# Patient Record
Sex: MALE | Race: OTHER | NOT HISPANIC OR LATINO | URBAN - METROPOLITAN AREA
[De-identification: names, ages, dates, MRNs, and addresses within clinical notes are randomized per-mention and may not be internally consistent; named-entity substitution may affect disease eponyms.]

---

## 2018-05-01 ENCOUNTER — INPATIENT (INPATIENT)
Facility: HOSPITAL | Age: 73
LOS: 2 days | Discharge: HOME | DRG: 246 | End: 2018-05-04
Attending: HOSPITALIST | Admitting: HOSPITALIST
Payer: COMMERCIAL

## 2018-05-01 VITALS
RESPIRATION RATE: 20 BRPM | TEMPERATURE: 98 F | HEART RATE: 72 BPM | DIASTOLIC BLOOD PRESSURE: 94 MMHG | SYSTOLIC BLOOD PRESSURE: 164 MMHG | OXYGEN SATURATION: 96 %

## 2018-05-01 DIAGNOSIS — E11.9 TYPE 2 DIABETES MELLITUS WITHOUT COMPLICATIONS: ICD-10-CM

## 2018-05-01 DIAGNOSIS — I20.0 UNSTABLE ANGINA: ICD-10-CM

## 2018-05-01 DIAGNOSIS — D72.1 EOSINOPHILIA: ICD-10-CM

## 2018-05-01 DIAGNOSIS — I10 ESSENTIAL (PRIMARY) HYPERTENSION: ICD-10-CM

## 2018-05-01 DIAGNOSIS — R07.9 CHEST PAIN, UNSPECIFIED: ICD-10-CM

## 2018-05-01 LAB
ALBUMIN SERPL ELPH-MCNC: 3.6 G/DL — SIGNIFICANT CHANGE UP (ref 3.3–5)
ALBUMIN SERPL ELPH-MCNC: 3.8 G/DL — SIGNIFICANT CHANGE UP (ref 3.3–5)
ALBUMIN SERPL ELPH-MCNC: 4 G/DL — SIGNIFICANT CHANGE UP (ref 3.3–5)
ALP SERPL-CCNC: 71 U/L — SIGNIFICANT CHANGE UP (ref 40–120)
ALP SERPL-CCNC: 72 U/L — SIGNIFICANT CHANGE UP (ref 40–120)
ALP SERPL-CCNC: 72 U/L — SIGNIFICANT CHANGE UP (ref 40–120)
ALT FLD-CCNC: 14 U/L — SIGNIFICANT CHANGE UP (ref 10–45)
ALT FLD-CCNC: 15 U/L — SIGNIFICANT CHANGE UP (ref 10–45)
ALT FLD-CCNC: 16 U/L — SIGNIFICANT CHANGE UP (ref 10–45)
ANION GAP SERPL CALC-SCNC: 12 MMOL/L — SIGNIFICANT CHANGE UP (ref 5–17)
ANION GAP SERPL CALC-SCNC: 13 MMOL/L — SIGNIFICANT CHANGE UP (ref 5–17)
ANION GAP SERPL CALC-SCNC: 13 MMOL/L — SIGNIFICANT CHANGE UP (ref 5–17)
ANISOCYTOSIS BLD QL: SLIGHT — SIGNIFICANT CHANGE UP
APTT BLD: 28.5 SEC — SIGNIFICANT CHANGE UP (ref 27.5–37.4)
AST SERPL-CCNC: 16 U/L — SIGNIFICANT CHANGE UP (ref 10–40)
AST SERPL-CCNC: 16 U/L — SIGNIFICANT CHANGE UP (ref 10–40)
AST SERPL-CCNC: 25 U/L — SIGNIFICANT CHANGE UP (ref 10–40)
BASOPHILS # BLD AUTO: 0.1 K/UL — SIGNIFICANT CHANGE UP (ref 0–0.2)
BASOPHILS # BLD AUTO: 0.1 K/UL — SIGNIFICANT CHANGE UP (ref 0–0.2)
BASOPHILS NFR BLD AUTO: 0.9 % — SIGNIFICANT CHANGE UP (ref 0–2)
BILIRUB SERPL-MCNC: 0.3 MG/DL — SIGNIFICANT CHANGE UP (ref 0.2–1.2)
BILIRUB SERPL-MCNC: 0.4 MG/DL — SIGNIFICANT CHANGE UP (ref 0.2–1.2)
BILIRUB SERPL-MCNC: 0.5 MG/DL — SIGNIFICANT CHANGE UP (ref 0.2–1.2)
BUN SERPL-MCNC: 14 MG/DL — SIGNIFICANT CHANGE UP (ref 7–23)
BUN SERPL-MCNC: 14 MG/DL — SIGNIFICANT CHANGE UP (ref 7–23)
BUN SERPL-MCNC: 17 MG/DL — SIGNIFICANT CHANGE UP (ref 7–23)
CALCIUM SERPL-MCNC: 9.4 MG/DL — SIGNIFICANT CHANGE UP (ref 8.4–10.5)
CALCIUM SERPL-MCNC: 9.6 MG/DL — SIGNIFICANT CHANGE UP (ref 8.4–10.5)
CALCIUM SERPL-MCNC: 9.7 MG/DL — SIGNIFICANT CHANGE UP (ref 8.4–10.5)
CHLORIDE SERPL-SCNC: 102 MMOL/L — SIGNIFICANT CHANGE UP (ref 96–108)
CHLORIDE SERPL-SCNC: 103 MMOL/L — SIGNIFICANT CHANGE UP (ref 96–108)
CHLORIDE SERPL-SCNC: 103 MMOL/L — SIGNIFICANT CHANGE UP (ref 96–108)
CK MB BLD-MCNC: 2.5 % — SIGNIFICANT CHANGE UP (ref 0–3.5)
CK MB CFR SERPL CALC: 1.2 NG/ML — SIGNIFICANT CHANGE UP (ref 0–6.7)
CK MB CFR SERPL CALC: 1.8 NG/ML — SIGNIFICANT CHANGE UP (ref 0–6.7)
CK MB CFR SERPL CALC: 3.6 NG/ML — SIGNIFICANT CHANGE UP (ref 0–6.7)
CK SERPL-CCNC: 72 U/L — SIGNIFICANT CHANGE UP (ref 30–200)
CK SERPL-CCNC: 81 U/L — SIGNIFICANT CHANGE UP (ref 30–200)
CK SERPL-CCNC: 93 U/L — SIGNIFICANT CHANGE UP (ref 30–200)
CO2 SERPL-SCNC: 23 MMOL/L — SIGNIFICANT CHANGE UP (ref 22–31)
CO2 SERPL-SCNC: 23 MMOL/L — SIGNIFICANT CHANGE UP (ref 22–31)
CO2 SERPL-SCNC: 24 MMOL/L — SIGNIFICANT CHANGE UP (ref 22–31)
CREAT SERPL-MCNC: 0.98 MG/DL — SIGNIFICANT CHANGE UP (ref 0.5–1.3)
CREAT SERPL-MCNC: 1.08 MG/DL — SIGNIFICANT CHANGE UP (ref 0.5–1.3)
CREAT SERPL-MCNC: 1.16 MG/DL — SIGNIFICANT CHANGE UP (ref 0.5–1.3)
DACRYOCYTES BLD QL SMEAR: SLIGHT — SIGNIFICANT CHANGE UP
ELLIPTOCYTES BLD QL SMEAR: SLIGHT — SIGNIFICANT CHANGE UP
EOSINOPHIL # BLD AUTO: 3.3 K/UL — HIGH (ref 0–0.5)
EOSINOPHIL # BLD AUTO: 3.5 K/UL — HIGH (ref 0–0.5)
EOSINOPHIL NFR BLD AUTO: 23 % — HIGH (ref 0–6)
EOSINOPHIL NFR BLD AUTO: 29.9 % — HIGH (ref 0–6)
GAS PNL BLDV: SIGNIFICANT CHANGE UP
GLUCOSE SERPL-MCNC: 164 MG/DL — HIGH (ref 70–99)
GLUCOSE SERPL-MCNC: 189 MG/DL — HIGH (ref 70–99)
GLUCOSE SERPL-MCNC: 194 MG/DL — HIGH (ref 70–99)
HBA1C BLD-MCNC: 8.1 % — HIGH (ref 4–5.6)
HCT VFR BLD CALC: 40 % — SIGNIFICANT CHANGE UP (ref 39–50)
HCT VFR BLD CALC: 40.5 % — SIGNIFICANT CHANGE UP (ref 39–50)
HCT VFR BLD CALC: 41.9 % — SIGNIFICANT CHANGE UP (ref 39–50)
HGB BLD-MCNC: 13.4 G/DL — SIGNIFICANT CHANGE UP (ref 13–17)
HGB BLD-MCNC: 13.4 G/DL — SIGNIFICANT CHANGE UP (ref 13–17)
HGB BLD-MCNC: 13.9 G/DL — SIGNIFICANT CHANGE UP (ref 13–17)
INR BLD: 1.15 RATIO — SIGNIFICANT CHANGE UP (ref 0.88–1.16)
LG PLATELETS BLD QL AUTO: SLIGHT — SIGNIFICANT CHANGE UP
LYMPHOCYTES # BLD AUTO: 28 % — SIGNIFICANT CHANGE UP (ref 13–44)
LYMPHOCYTES # BLD AUTO: 3.4 K/UL — HIGH (ref 1–3.3)
LYMPHOCYTES # BLD AUTO: 36.3 % — SIGNIFICANT CHANGE UP (ref 13–44)
LYMPHOCYTES # BLD AUTO: 4.2 K/UL — HIGH (ref 1–3.3)
MAGNESIUM SERPL-MCNC: 2.2 MG/DL — SIGNIFICANT CHANGE UP (ref 1.6–2.6)
MCHC RBC-ENTMCNC: 28.6 PG — SIGNIFICANT CHANGE UP (ref 27–34)
MCHC RBC-ENTMCNC: 28.8 PG — SIGNIFICANT CHANGE UP (ref 27–34)
MCHC RBC-ENTMCNC: 28.9 PG — SIGNIFICANT CHANGE UP (ref 27–34)
MCHC RBC-ENTMCNC: 33.2 GM/DL — SIGNIFICANT CHANGE UP (ref 32–36)
MCHC RBC-ENTMCNC: 33.2 GM/DL — SIGNIFICANT CHANGE UP (ref 32–36)
MCHC RBC-ENTMCNC: 33.4 GM/DL — SIGNIFICANT CHANGE UP (ref 32–36)
MCV RBC AUTO: 86.4 FL — SIGNIFICANT CHANGE UP (ref 80–100)
MCV RBC AUTO: 86.4 FL — SIGNIFICANT CHANGE UP (ref 80–100)
MCV RBC AUTO: 86.8 FL — SIGNIFICANT CHANGE UP (ref 80–100)
MICROCYTES BLD QL: SLIGHT — SIGNIFICANT CHANGE UP
MONOCYTES # BLD AUTO: 0.7 K/UL — SIGNIFICANT CHANGE UP (ref 0–0.9)
MONOCYTES # BLD AUTO: 0.9 K/UL — SIGNIFICANT CHANGE UP (ref 0–0.9)
MONOCYTES NFR BLD AUTO: 5.6 % — SIGNIFICANT CHANGE UP (ref 2–14)
MONOCYTES NFR BLD AUTO: 6 % — SIGNIFICANT CHANGE UP (ref 2–14)
NEUTROPHILS # BLD AUTO: 3.2 K/UL — SIGNIFICANT CHANGE UP (ref 1.8–7.4)
NEUTROPHILS # BLD AUTO: 4 K/UL — SIGNIFICANT CHANGE UP (ref 1.8–7.4)
NEUTROPHILS NFR BLD AUTO: 27.4 % — LOW (ref 43–77)
NEUTROPHILS NFR BLD AUTO: 43 % — SIGNIFICANT CHANGE UP (ref 43–77)
PHOSPHATE SERPL-MCNC: 3.2 MG/DL — SIGNIFICANT CHANGE UP (ref 2.5–4.5)
PLAT MORPH BLD: ABNORMAL
PLATELET # BLD AUTO: 256 K/UL — SIGNIFICANT CHANGE UP (ref 150–400)
PLATELET # BLD AUTO: 264 K/UL — SIGNIFICANT CHANGE UP (ref 150–400)
PLATELET # BLD AUTO: 274 K/UL — SIGNIFICANT CHANGE UP (ref 150–400)
POLYCHROMASIA BLD QL SMEAR: SLIGHT — SIGNIFICANT CHANGE UP
POTASSIUM SERPL-MCNC: 3.9 MMOL/L — SIGNIFICANT CHANGE UP (ref 3.5–5.3)
POTASSIUM SERPL-MCNC: 4.3 MMOL/L — SIGNIFICANT CHANGE UP (ref 3.5–5.3)
POTASSIUM SERPL-MCNC: 4.7 MMOL/L — SIGNIFICANT CHANGE UP (ref 3.5–5.3)
POTASSIUM SERPL-SCNC: 3.9 MMOL/L — SIGNIFICANT CHANGE UP (ref 3.5–5.3)
POTASSIUM SERPL-SCNC: 4.3 MMOL/L — SIGNIFICANT CHANGE UP (ref 3.5–5.3)
POTASSIUM SERPL-SCNC: 4.7 MMOL/L — SIGNIFICANT CHANGE UP (ref 3.5–5.3)
PROT SERPL-MCNC: 7.4 G/DL — SIGNIFICANT CHANGE UP (ref 6–8.3)
PROT SERPL-MCNC: 7.4 G/DL — SIGNIFICANT CHANGE UP (ref 6–8.3)
PROT SERPL-MCNC: 7.7 G/DL — SIGNIFICANT CHANGE UP (ref 6–8.3)
PROTHROM AB SERPL-ACNC: 12.6 SEC — SIGNIFICANT CHANGE UP (ref 9.8–12.7)
RBC # BLD: 4.62 M/UL — SIGNIFICANT CHANGE UP (ref 4.2–5.8)
RBC # BLD: 4.69 M/UL — SIGNIFICANT CHANGE UP (ref 4.2–5.8)
RBC # BLD: 4.82 M/UL — SIGNIFICANT CHANGE UP (ref 4.2–5.8)
RBC # FLD: 12.7 % — SIGNIFICANT CHANGE UP (ref 10.3–14.5)
RBC # FLD: 12.8 % — SIGNIFICANT CHANGE UP (ref 10.3–14.5)
RBC # FLD: 12.9 % — SIGNIFICANT CHANGE UP (ref 10.3–14.5)
RBC BLD AUTO: ABNORMAL
SODIUM SERPL-SCNC: 137 MMOL/L — SIGNIFICANT CHANGE UP (ref 135–145)
SODIUM SERPL-SCNC: 139 MMOL/L — SIGNIFICANT CHANGE UP (ref 135–145)
SODIUM SERPL-SCNC: 140 MMOL/L — SIGNIFICANT CHANGE UP (ref 135–145)
STOMATOCYTES BLD QL SMEAR: SLIGHT — SIGNIFICANT CHANGE UP
TARGETS BLD QL SMEAR: SLIGHT — SIGNIFICANT CHANGE UP
TROPONIN T SERPL-MCNC: 0.05 NG/ML — SIGNIFICANT CHANGE UP (ref 0–0.06)
TROPONIN T SERPL-MCNC: 0.06 NG/ML — SIGNIFICANT CHANGE UP (ref 0–0.06)
TROPONIN T SERPL-MCNC: 0.06 NG/ML — SIGNIFICANT CHANGE UP (ref 0–0.06)
TROPONIN T SERPL-MCNC: 0.09 NG/ML — HIGH (ref 0–0.06)
VIT B12 SERPL-MCNC: 563 PG/ML — SIGNIFICANT CHANGE UP (ref 232–1245)
WBC # BLD: 11.4 K/UL — HIGH (ref 3.8–10.5)
WBC # BLD: 11.7 K/UL — HIGH (ref 3.8–10.5)
WBC # BLD: 12.6 K/UL — HIGH (ref 3.8–10.5)
WBC # FLD AUTO: 11.4 K/UL — HIGH (ref 3.8–10.5)
WBC # FLD AUTO: 11.7 K/UL — HIGH (ref 3.8–10.5)
WBC # FLD AUTO: 12.6 K/UL — HIGH (ref 3.8–10.5)

## 2018-05-01 PROCEDURE — 92941 PRQ TRLML REVSC TOT OCCL AMI: CPT | Mod: RC

## 2018-05-01 PROCEDURE — 99285 EMERGENCY DEPT VISIT HI MDM: CPT | Mod: 25

## 2018-05-01 PROCEDURE — 99053 MED SERV 10PM-8AM 24 HR FAC: CPT

## 2018-05-01 PROCEDURE — 99223 1ST HOSP IP/OBS HIGH 75: CPT

## 2018-05-01 PROCEDURE — 93458 L HRT ARTERY/VENTRICLE ANGIO: CPT | Mod: 26,59

## 2018-05-01 PROCEDURE — 71046 X-RAY EXAM CHEST 2 VIEWS: CPT | Mod: 26

## 2018-05-01 PROCEDURE — 93010 ELECTROCARDIOGRAM REPORT: CPT

## 2018-05-01 PROCEDURE — 99152 MOD SED SAME PHYS/QHP 5/>YRS: CPT

## 2018-05-01 PROCEDURE — 92929: CPT | Mod: RC

## 2018-05-01 RX ORDER — ATORVASTATIN CALCIUM 80 MG/1
80 TABLET, FILM COATED ORAL DAILY
Qty: 0 | Refills: 0 | Status: DISCONTINUED | OUTPATIENT
Start: 2018-05-01 | End: 2018-05-04

## 2018-05-01 RX ORDER — INSULIN LISPRO 100/ML
VIAL (ML) SUBCUTANEOUS
Qty: 0 | Refills: 0 | Status: DISCONTINUED | OUTPATIENT
Start: 2018-05-01 | End: 2018-05-04

## 2018-05-01 RX ORDER — CLOPIDOGREL BISULFATE 75 MG/1
75 TABLET, FILM COATED ORAL DAILY
Qty: 0 | Refills: 0 | Status: DISCONTINUED | OUTPATIENT
Start: 2018-05-02 | End: 2018-05-04

## 2018-05-01 RX ORDER — GLUCAGON INJECTION, SOLUTION 0.5 MG/.1ML
1 INJECTION, SOLUTION SUBCUTANEOUS ONCE
Qty: 0 | Refills: 0 | Status: DISCONTINUED | OUTPATIENT
Start: 2018-05-01 | End: 2018-05-04

## 2018-05-01 RX ORDER — HEPARIN SODIUM 5000 [USP'U]/ML
6000 INJECTION INTRAVENOUS; SUBCUTANEOUS EVERY 6 HOURS
Qty: 0 | Refills: 0 | Status: DISCONTINUED | OUTPATIENT
Start: 2018-05-01 | End: 2018-05-01

## 2018-05-01 RX ORDER — ASPIRIN/CALCIUM CARB/MAGNESIUM 324 MG
81 TABLET ORAL DAILY
Qty: 0 | Refills: 0 | Status: DISCONTINUED | OUTPATIENT
Start: 2018-05-01 | End: 2018-05-04

## 2018-05-01 RX ORDER — SODIUM CHLORIDE 9 MG/ML
1000 INJECTION, SOLUTION INTRAVENOUS
Qty: 0 | Refills: 0 | Status: DISCONTINUED | OUTPATIENT
Start: 2018-05-01 | End: 2018-05-04

## 2018-05-01 RX ORDER — CLOPIDOGREL BISULFATE 75 MG/1
300 TABLET, FILM COATED ORAL ONCE
Qty: 0 | Refills: 0 | Status: COMPLETED | OUTPATIENT
Start: 2018-05-01 | End: 2018-05-01

## 2018-05-01 RX ORDER — INSULIN LISPRO 100/ML
VIAL (ML) SUBCUTANEOUS AT BEDTIME
Qty: 0 | Refills: 0 | Status: DISCONTINUED | OUTPATIENT
Start: 2018-05-01 | End: 2018-05-04

## 2018-05-01 RX ORDER — ASPIRIN/CALCIUM CARB/MAGNESIUM 324 MG
324 TABLET ORAL ONCE
Qty: 0 | Refills: 0 | Status: COMPLETED | OUTPATIENT
Start: 2018-05-01 | End: 2018-05-01

## 2018-05-01 RX ORDER — HEPARIN SODIUM 5000 [USP'U]/ML
INJECTION INTRAVENOUS; SUBCUTANEOUS
Qty: 25000 | Refills: 0 | Status: DISCONTINUED | OUTPATIENT
Start: 2018-05-01 | End: 2018-05-01

## 2018-05-01 RX ORDER — CAPTOPRIL 12.5 MG/1
12.5 TABLET ORAL DAILY
Qty: 0 | Refills: 0 | Status: DISCONTINUED | OUTPATIENT
Start: 2018-05-01 | End: 2018-05-01

## 2018-05-01 RX ORDER — ACETAMINOPHEN 500 MG
650 TABLET ORAL EVERY 6 HOURS
Qty: 0 | Refills: 0 | Status: DISCONTINUED | OUTPATIENT
Start: 2018-05-01 | End: 2018-05-04

## 2018-05-01 RX ORDER — HEPARIN SODIUM 5000 [USP'U]/ML
5000 INJECTION INTRAVENOUS; SUBCUTANEOUS ONCE
Qty: 0 | Refills: 0 | Status: COMPLETED | OUTPATIENT
Start: 2018-05-01 | End: 2018-05-01

## 2018-05-01 RX ORDER — DEXTROSE 50 % IN WATER 50 %
25 SYRINGE (ML) INTRAVENOUS ONCE
Qty: 0 | Refills: 0 | Status: DISCONTINUED | OUTPATIENT
Start: 2018-05-01 | End: 2018-05-04

## 2018-05-01 RX ORDER — DEXTROSE 50 % IN WATER 50 %
1 SYRINGE (ML) INTRAVENOUS ONCE
Qty: 0 | Refills: 0 | Status: DISCONTINUED | OUTPATIENT
Start: 2018-05-01 | End: 2018-05-04

## 2018-05-01 RX ORDER — HYDRALAZINE HCL 50 MG
10 TABLET ORAL ONCE
Qty: 0 | Refills: 0 | Status: DISCONTINUED | OUTPATIENT
Start: 2018-05-01 | End: 2018-05-01

## 2018-05-01 RX ORDER — CAPTOPRIL 12.5 MG/1
12.5 TABLET ORAL THREE TIMES A DAY
Qty: 0 | Refills: 0 | Status: DISCONTINUED | OUTPATIENT
Start: 2018-05-01 | End: 2018-05-02

## 2018-05-01 RX ORDER — HYDRALAZINE HCL 50 MG
10 TABLET ORAL THREE TIMES A DAY
Qty: 0 | Refills: 0 | Status: DISCONTINUED | OUTPATIENT
Start: 2018-05-01 | End: 2018-05-01

## 2018-05-01 RX ORDER — CAPTOPRIL 12.5 MG/1
6.25 TABLET ORAL DAILY
Qty: 0 | Refills: 0 | Status: DISCONTINUED | OUTPATIENT
Start: 2018-05-01 | End: 2018-05-01

## 2018-05-01 RX ADMIN — ATORVASTATIN CALCIUM 80 MILLIGRAM(S): 80 TABLET, FILM COATED ORAL at 21:42

## 2018-05-01 RX ADMIN — Medication 1: at 17:34

## 2018-05-01 RX ADMIN — CLOPIDOGREL BISULFATE 300 MILLIGRAM(S): 75 TABLET, FILM COATED ORAL at 06:40

## 2018-05-01 RX ADMIN — CAPTOPRIL 12.5 MILLIGRAM(S): 12.5 TABLET ORAL at 14:12

## 2018-05-01 RX ADMIN — Medication 324 MILLIGRAM(S): at 02:26

## 2018-05-01 RX ADMIN — CAPTOPRIL 12.5 MILLIGRAM(S): 12.5 TABLET ORAL at 21:42

## 2018-05-01 RX ADMIN — HEPARIN SODIUM 1000 UNIT(S)/HR: 5000 INJECTION INTRAVENOUS; SUBCUTANEOUS at 06:56

## 2018-05-01 RX ADMIN — HEPARIN SODIUM 5000 UNIT(S): 5000 INJECTION INTRAVENOUS; SUBCUTANEOUS at 06:56

## 2018-05-01 NOTE — H&P ADULT - NSHPPHYSICALEXAM_GEN_ALL_CORE
Vital Signs Last 24 Hrs  T(C): 36.3 (01 May 2018 05:15), Max: 36.5 (01 May 2018 01:49)  T(F): 97.4 (01 May 2018 05:15), Max: 97.7 (01 May 2018 01:49)  HR: 64 (01 May 2018 05:15) (64 - 72)  BP: 153/94 (01 May 2018 05:15) (153/94 - 164/94)  BP(mean): --  RR: 18 (01 May 2018 05:15) (18 - 20)  SpO2: 100% (01 May 2018 05:15) (96% - 100%)    GENERAL: mild  distress, well-developed, obese abdomen   HEAD:  Atraumatic, Normocephalic  ENT: EOMI, PERRLA, conjunctiva and sclera clear,  moist mucosa no pharyngeal erythema or exudates   NECK: supple , no JVD   CHEST/LUNG: Clear to auscultation bilaterally; No wheeze, equal breath sounds bilaterally   BACK: No spinal tenderness,  No CVA tenderness   HEART: Regular rate and rhythm; soft systolic murmur heard at LICS, no rubs, or gallops  ABDOMEN: Soft, Nontender, Nondistended; Bowel sounds present  EXTREMITIES:  No clubbing, cyanosis, trace bilateral  edema  MSK: No joint swelling or effusions, ROM intact   PSYCH: Normal behavior/affect  NEUROLOGY: AAOx3, non-focal, cranial nerves intact  SKIN: Normal color, No rashes or lesions

## 2018-05-01 NOTE — H&P ADULT - FAMILY HISTORY
Father  Still living? Unknown  Family history of acute myocardial infarction, Age at diagnosis: Age Unknown     Sibling  Still living? No  Family history of acute myocardial infarction, Age at diagnosis: Age Unknown

## 2018-05-01 NOTE — H&P ADULT - NSHPLABSRESULTS_GEN_ALL_CORE
Labs personally reviewed:                          13.4   11.4  )-----------( 256      ( 01 May 2018 02:31 )             40.0     05-01    140  |  103  |  17  ----------------------------<  164<H>  4.3   |  24  |  1.16    Ca    9.7      01 May 2018 02:33    TPro  7.7  /  Alb  4.0  /  TBili  0.3  /  DBili  x   /  AST  16  /  ALT  16  /  AlkPhos  72  05-01    CARDIAC MARKERS ( 01 May 2018 05:25 )  x     / 0.06 ng/mL / 81 U/L / x     / 1.2 ng/mL  CARDIAC MARKERS ( 01 May 2018 02:33 )  x     / 0.06 ng/mL / 79 U/L / x     / 1.5 ng/mL      LIVER FUNCTIONS - ( 01 May 2018 02:33 )  Alb: 4.0 g/dL / Pro: 7.7 g/dL / ALK PHOS: 72 U/L / ALT: 16 U/L / AST: 16 U/L / GGT: x           PT/INR - ( 01 May 2018 02:31 )   PT: 12.6 sec;   INR: 1.15 ratio         PTT - ( 01 May 2018 02:31 )  PTT:28.5 sec    CAPILLARY BLOOD GLUCOSE          Imaging:  CXR personally reviewed: no focal opacity,    EKG personally reviewed:

## 2018-05-01 NOTE — ED ADULT NURSE NOTE - CHIEF COMPLAINT QUOTE
pt c/o CP, sob, left arm pain and weakness to legs since just getting off flight from [facility]

## 2018-05-01 NOTE — H&P ADULT - PROBLEM SELECTOR PLAN 4
23% eosinophils on cbc , may be contributing to presentation  ? eosinophilic esophagitis or myocarditis   - strongyloides  - consider heme eval to review smear   - b12 level

## 2018-05-01 NOTE — PROGRESS NOTE ADULT - SUBJECTIVE AND OBJECTIVE BOX
Patient is a 72y old  Male who presents with a chief complaint of chest pain x one day (01 May 2018 06:29)     History of Present Illness: 	  Patient is a 72 year old [place] male with a past medical history significant for DM type II , HTN  presents with chest pain  for one day, pain is 4/10 in severity ,  localized to the left side of the chest, intermittent, lasting about 10 minutes occurring over about an hour , radiating to the left arm, feels like heaviness, associated with shortness of breath, palpitations, and  left arm numbness and tingling. Per patient’s daughter, patient had similar symptoms two weeks prior to admission while performing moderate activity. Symptoms were followed by fever and a few days of flu like symptoms, which have since resolved. Patient was now on a flight to [place] for vacation when symptoms recurred mid flight. He had a similar presentation a few years ago. Nuclear stress test at that time was normal.  Patient has a strong family history for MI including father  in 40's  and brother  in mid 30's.         REVIEW OF SYSTEMS:  CONSTITUTIONAL: No weakness, fevers or chills  EYES/ENT: No visual changes, no throat pain   RESPIRATORY: No cough, wheezing, hemoptysis; No shortness of breath  CARDIOVASCULAR: No chest pain or palpitations  GASTROINTESTINAL: No abdominal, nausea, vomiting, or hematemesis; No diarrhea or constipation. No melena or hematochezia.  GENITOURINARY: No dysuria, frequency or hematuria  NEUROLOGICAL: No dizziness, numbness, or weakness  SKIN: No itching, burning, rashes, or lesions   All other review of systems is negative unless indicated above.    VITAL SIGNS:  Vital Signs Last 24 Hrs  T(C): 36.8 (01 May 2018 11:19), Max: 36.8 (01 May 2018 11:19)  T(F): 98.3 (01 May 2018 11:19), Max: 98.3 (01 May 2018 11:19)  HR: 64 (01 May 2018 11:19) (61 - 72)  BP: 161/81 (01 May 2018 11:19) (149/83 - 164/94)  BP(mean): 108 (01 May 2018 11:19) (108 - 108)  RR: 16 (01 May 2018 11:19) (16 - 20)  SpO2: 98% (01 May 2018 11:19) (96% - 100%)    PHYSICAL EXAM:   GENERAL: no acute distress  HEENT: NC/AT, EOMI, neck supple, MMM  RESPIRATORY: LCTAB/L, no rhonchi, rales, or wheezing  CARDIOVASCULAR: RRR, no murmurs, gallops, rubs  ABDOMINAL: soft, non-tender, non-distended, positive bowel sounds   EXTREMITIES: no clubbing, cyanosis, or edema  NEUROLOGICAL: alert and oriented x 3, non-focal  SKIN: no rashes or lesions   MUSCULOSKELETAL: no gross joint deformity                          13.4   11.7  )-----------( 274      ( 01 May 2018 10:31 )             40.5     05-01    139  |  103  |  14  ----------------------------<  189<H>  3.9   |  23  |  0.98    Ca    9.6      01 May 2018 10:31    TPro  7.4  /  Alb  3.8  /  TBili  0.5  /  DBili  x   /  AST  16  /  ALT  15  /  AlkPhos  72  05-01    PT/INR - ( 01 May 2018 02:31 )   PT: 12.6 sec;   INR: 1.15 ratio         PTT - ( 01 May 2018 02:31 )  PTT:28.5 sec    CAPILLARY BLOOD GLUCOSE      POCT Blood Glucose.: 172 mg/dL (01 May 2018 10:44)  POCT Blood Glucose.: 147 mg/dL (01 May 2018 06:46)    I&O's Summary    30 Apr 2018 07:01  -  01 May 2018 07:00  --------------------------------------------------------  IN: 0 mL / OUT: 600 mL / NET: -600 mL    01 May 2018 07:01  -  01 May 2018 11:48  --------------------------------------------------------  IN: 240 mL / OUT: 0 mL / NET: 240 mL        MEDICATIONS  (STANDING):  aspirin enteric coated 81 milliGRAM(s) Oral daily  atorvastatin 80 milliGRAM(s) Oral daily  dextrose 5%. 1000 milliLiter(s) (50 mL/Hr) IV Continuous <Continuous>  dextrose 50% Injectable 25 Gram(s) IV Push once  heparin  Infusion.  Unit(s)/Hr (10 mL/Hr) IV Continuous <Continuous>  insulin lispro (HumaLOG) corrective regimen sliding scale   SubCutaneous three times a day before meals  insulin lispro (HumaLOG) corrective regimen sliding scale   SubCutaneous at bedtime

## 2018-05-01 NOTE — ED ADULT NURSE NOTE - OBJECTIVE STATEMENT
71 yo M arrived ambulatory from triage c/o midsternal chest pain started shortly after coming off Airplane 2 hrs ago and is non-radiating. Pain has been intermittent. Described as heaviness in the Midsternal chest. 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 Cardiac monitor applied O2 at 2l/min via nasal canula initiated. #18 gauge in RAC x's 1 attempt pt. tolerated well, labs drawn and sent, EKG and CXR at bedside. Dr. Jean Baptiste aware and at bedside evaluating.

## 2018-05-01 NOTE — H&P ADULT - NSHPSOCIALHISTORY_GEN_ALL_CORE
Social History:    Marital Status:  (   )    (   ) Single    (   )    (x )   Occupation: retired farmer   Lives with: ( x ) alone  (  ) children   (  ) spouse   (  ) parents  (  ) other    Substance Use (street drugs): ( x ) never used  (  ) other:  Tobacco Usage:  (  x ) never smoked   (   ) former smoker   (   ) current smoker  (     ) pack year  (        ) last cigarette date  Alcohol Usage: former  etoh use

## 2018-05-01 NOTE — CHART NOTE - NSCHARTNOTEFT_GEN_A_CORE
This patient was transferred to CCU following cardiac catheterization prior to my assessment.     IGNACIA Larson  hospitalist  pager [phone]

## 2018-05-01 NOTE — PROGRESS NOTE ADULT - ASSESSMENT
72 m w/pmh T2DM , HTN   p/w atypical chest pain , cardiac enzymes negative x 3, in the setting of unstable angina. Now s/p cath with 72 m w/pmh T2DM , HTN   p/w atypical chest pain , cardiac enzymes negative x 3, in the setting of unstable angina. Now s/p cath with 100%  occlusion distal RCA s/p VIGNESH x 1, 90% occlusion proximal RPL s/p VIGNESH x 1,  and 95% occlusion to proximal RPDA s/p DEX x 1. Occlusions were also noted in prox LAD and prox diagonal, with staged PCI of LAD/Diagonal planned for tomorrow. 72 m w/pmh T2DM , HTN   p/w atypical chest pain , cardiac enzymes negative x 3, in the setting of unstable angina. Now s/p cath with 100%  occlusion distal RCA s/p VIGNESH x 1, 90% occlusion proximal RPL s/p VIGNESH x 1,  and 95% occlusion to proximal RPDA s/p DEX x 1. Occlusions were also noted in prox LAD and prox diagonal, with staged PCI of LAD/Diagonal planned for tomorrow.     Cardiovascular 72 m w/pmh T2DM , HTN   p/w atypical chest pain , cardiac enzymes negative x 3, in the setting of unstable angina. Now s/p cath with 100%  occlusion distal RCA s/p VIGNESH x 1, 90% occlusion proximal RPL s/p VIGNESH x 1,  and 95% occlusion to proximal RPDA s/p DEX x 1. Occlusions were also noted in prox LAD and prox diagonal, with staged PCI of LAD/Diagonal planned for tomorrow.     CV:   Problem: Unstable Angina  -VIGNESH RCA x 1, RPL x1, RPDA x 1, as above  -Staged PCI of LAD/Diag tomorrow  -Continue heparin gtt   -Continue with ASA and Plavix  -Initiating home dose of captopril, 12.5 mg daily  - BB once HR tolerates    Problem: Hypertension  -re-initiating captopril, as above  -Hydralazine IV if hypertension persists  -Will not start BB now given mild bradycardia    Pulm:  -No active issues  -Monitor for signs of overload    GI:   -No active issues  -Carb controlled/DASH diet    :   -No active issues  -continue monitoring creatinine     Endocrine:   Problem: T2DM  -Holding home metformin  -Low dose ISS    Heme:   Problem: Eosinophilia  -f/u strongyloides ab  -Consider stool O&P    ID:   -No active issues    DVT ppx:   -On heparin gtt 72 m w/pmh T2DM , HTN   p/w atypical chest pain , cardiac enzymes negative x 3, in the setting of unstable angina. Now s/p cath with 100%  occlusion distal RCA s/p VIGNESH x 1, 90% occlusion proximal RPL s/p VIGNESH x 1,  and 95% occlusion to proximal RPDA s/p DEX x 1. Occlusions were also noted in prox LAD and prox diagonal, with staged PCI of LAD/Diagonal planned for tomorrow.     CV:   Problem: Unstable Angina  -VIGNESH RCA x 1, RPL x1, RPDA x 1, as above  -Staged PCI of LAD/Diag tomorrow  -Continue with ASA and Plavix  -Initiating home dose of captopril, 12.5 mg TID  - BB once HR tolerates    Problem: Hypertension  -re-initiating captopril, as above  -Hydralazine IV if hypertension persists  -Will not start BB now given slower HR     Pulm:  -No active issues  -Monitor for signs of overload    GI:   -No active issues  -Carb controlled/DASH diet    :   -No active issues  -continue monitoring creatinine     Endocrine:   Problem: T2DM  -Holding home metformin  -Low dose ISS    Heme:   Problem: Eosinophilia  -f/u strongyloides ab    ID:   -No active issues    DVT ppx:   -SCDs

## 2018-05-01 NOTE — H&P ADULT - HISTORY OF PRESENT ILLNESS
Patient is a 72 year old [place] male with a past medical history significant for DM type II , HTN  presents with chest pain  for one day, pain is 4/10 in severity ,  localized to the left side of the chest, intermittent, lasting about 10 minutes occurring over about an hour , radiating to the left arm, feels like heaviness, associated with shortness of breath, palpitations, and  left arm numbness and tingling. Per patient’s daughter, patient had similar symptoms two weeks prior to admission while performing moderate activity. Symptoms were followed by fever and a few days of flu like symptoms, which have since resolved. Patient was now on a flight to [place] for vacation when symptoms recurred mid flight. He had a similar presentation a few years ago. Nuclear stress test at that time was normal.  Patient has a strong family history for MI including father  in 40's  and brother  in mid 30's.

## 2018-05-01 NOTE — ED ADULT TRIAGE NOTE - CHIEF COMPLAINT QUOTE
pt c/o CP, sob, left arm pain and weakness to legs since just getting off flight from [facility]

## 2018-05-01 NOTE — ED PROVIDER NOTE - OBJECTIVE STATEMENT
73 yo M w/ pmhx of DM, HTN c/o chest pain since last night 11:30pm. Pain is localized to the left side of the chest, intermittent, lasting 10mns, radiating to the left arm, feels like heaviness, associated with SOB, left arm numbness and tingling. Father had MI at the age of 40. Patient had similar pain about 2 weeks ago while exerting himself but never sought medical attention. denies any productive cough, fever, chills, nausea, vomiting, leg swelling, hx of blood clots or anything else.

## 2018-05-01 NOTE — CHART NOTE - NSCHARTNOTEFT_GEN_A_CORE
Patient underwent a PCI procedure and is being admitted as they are at increased risk for major adverse cardiac and vascular events if discharged due to the following high risk characteristics:      Pre-procedure Clinical Criteria  Major : Acute Coronary Syndrome - NSTEMI    Angiographic Criteria   Major: Need for staged procedure before discharge        MARCO Magaña [id number]

## 2018-05-01 NOTE — CHART NOTE - NSCHARTNOTEFT_GEN_A_CORE
Removal of Radial Band    Pulses in the right upper extremity are palpable. The patient was placed in the supine position. The insertion site was identified and the band deflated per protocol. The radial band was removed slowly. Direct pressure was applied for  2 minutes and a pressure dressing was applied.      Monitoring of the right wrist and both upper extremities including neuro-vascular checks and vital signs every 15 minutes x 4.    Complications: None    Comments: No bleeding, ecchymosis or hematoma. Pressure dressing placed. Patient instructed to move extremity gently, no soaking, scrubbing or application of creams/lotions to area.

## 2018-05-01 NOTE — ED PROVIDER NOTE - INTERPRETATION
EKG reviewed for rate, rhythm, axis, intervals and segments, including QRS morphology, P wave appearance T wave appearance, VT interval, and QT interval.  I find the EKG to be unremarkable in all of these regards except as follows: RBBB, inferorlateral TWI

## 2018-05-01 NOTE — H&P ADULT - PROBLEM SELECTOR PLAN 1
Discussed case with cardiology , history concerning for unstable angina , given strong family history and description of symptoms will plan for cath   - Plavix load   - ASA   - heparin infusion   - Cardiology consult appreciated - further recommendations to be follower up by day team   - Cardiac cath per cardiology  - NPO for cath  - serial cardiac enzymes   - monitor on telemetry

## 2018-05-01 NOTE — ED ADULT NURSE NOTE - CHPI ED SYMPTOMS NEG
no vomiting/no nausea/no syncope/no dizziness/no chills/no back pain/no cough/no fever/no diaphoresis

## 2018-05-01 NOTE — H&P ADULT - NSHPREVIEWOFSYSTEMS_GEN_ALL_CORE
CONSTITUTIONAL: No weakness, fevers or chills  EYES/ENT: No visual changes;  No dysphagia  NECK: No pain or stiffness  RESPIRATORY: No cough, wheezing, hemoptysis; + shortness of breath  CARDIOVASCULAR: + chest pain + palpitations; No lower extremity edema  EXTREMITIES: no le edema, cyanosis, clubbing  GASTROINTESTINAL: No abdominal or epigastric pain. No nausea, vomiting, or hematemesis; No diarrhea or constipation. No melena or hematochezia.  BACK: No back pain  GENITOURINARY: No dysuria, frequency or hematuria  NEUROLOGICAL: No numbness or weakness  MSK: no joint swelling or pain  SKIN: No itching, burning, rashes, or lesions   PSYCH: no agitation  All other review of systems is negative unless indicated above.

## 2018-05-01 NOTE — CONSULT NOTE ADULT - SUBJECTIVE AND OBJECTIVE BOX
Cardiology Attending Consult Note      CHIEF COMPLAINT/REASON FOR CONSULT: Chest Pain   Date of Admission: 18    HISTORY OF PRESENT ILLNESS:    72y.o. Male with DM, HTN, +FH (Father with MI@44, Brother with MI before 50), admitted 2018 with substernal chest pain, 6-7/10, pressure like, radiating to left arm, intermittent in intensity, present since 11:30 PM last night. He reports that he has been feeling unwell over the last several weeks, 2 weeks ago had an episode of fever/chills which he through was the flu. 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 He also notes an episode of chest pain similar to this 3 months ago. Yesterday he was flying from [facility] [place] for vacation when he began having chest pain as noted above. Of note, he had a similar episode in  that began while flying, subsequently presented to the ED and underwent pharmacologic MPI which was negative at that time. At the time of my visit this morning he continues to have substernal chest pain, 4-5/10, and appears uncomfortable. He denies any N/V/D/F/C. No HA. No dizziness.     In the ED: EKG NSR, RBBB, TWI II, III, AF, V4-V6, overall unchanged when compared to prior EKG 2018, Troponin 0.06 -> 0.06    ALLERGIES: NKDA    Home Medications:  aspirin: 81 milligram(s) orally once a day (01 May 2018 06:31)  captopril: 12.5 milligram(s) orally once a day (01 May 2018 06:31)  metFORMIN: 500 milligram(s) orally 2 times a day (01 May 2018 06:31)  pravastatin 40 mg oral tablet: 1 tab(s) orally once a day (at bedtime) (01 May 2018 06:31)    MEDICATIONS:  clopidogrel Tablet 300 milliGRAM(s) Oral once    PAST MEDICAL & SURGICAL HISTORY:  Diabetes  Hypertension  No significant past surgical history      FAMILY HISTORY:  +FH: Father with MI@44, Brother with MI at <50      SOCIAL HISTORY:    Never smoked, social ETOH 1x/month, no IVDU  -Former  in [place], retired    REVIEW OF SYSTEMS:    CONSTITUTIONAL: No weakness, fevers or chills  EYES/ENT: No visual changes;  No vertigo or throat pain   NECK: No pain or stiffness  RESPIRATORY: No cough, wheezing, hemoptysis; +shortness of breath  CARDIOVASCULAR: +chest pain or palpitations  GASTROINTESTINAL: No abdominal or epigastric pain. No nausea, vomiting, or hematemesis; No diarrhea or constipation. No melena or hematochezia.  GENITOURINARY: No dysuria, frequency or hematuria  NEUROLOGICAL: No numbness or weakness  SKIN: No itching, burning, rashes, or lesions   All other review of systems is negative unless indicated above.    PHYSICAL EXAM:  T(C): 36.3 (18 @ 05:15), Max: 36.5 (18 @ 01:49)  HR: 64 (18 @ 05:15) (64 - 72)  BP: 153/94 (18 @ 05:15) (153/94 - 164/94)  RR: 18 (18 @ 05:15) (18 - 20)  SpO2: 100% (18 @ 05:15) (96% - 100%)  Wt(kg): --    Drug Dosing Weight      I&O's Summary      Appearance: Normal	  HEENT:   Normal oral mucosa, PERRL, EOMI	  Lymphatic: No lymphadenopathy  Cardiovascular: Normal S1 S2, No JVD, No murmurs  Respiratory: Lungs clear to auscultation	  Psychiatry: A & O x 3, Mood & affect appropriate  Gastrointestinal:  Soft, Non-tender, + BS	  Skin: No rashes, No ecchymoses, No cyanosis	  Neurologic: Non-focal  Extremities: Normal range of motion, No clubbing, cyanosis or edema  Vascular: Peripheral pulses palpable 2+ bilaterally    LABS:	 	    CBC Full  -  ( 01 May 2018 02:31 )  WBC Count : 11.4 K/uL  Hemoglobin : 13.4 g/dL  Hematocrit : 40.0 %  Platelet Count - Automated : 256 K/uL  Mean Cell Volume : 86.4 fl  Mean Cell Hemoglobin : 28.9 pg  Mean Cell Hemoglobin Concentration : 33.4 gm/dL  Auto Neutrophil # : 4.0 K/uL  Auto Lymphocyte # : 3.4 K/uL  Auto Monocyte # : 0.9 K/uL  Auto Eosinophil # : 3.3 K/uL  Auto Basophil # : 0.1 K/uL  Auto Neutrophil % : 43.0 %  Auto Lymphocyte % : 28.0 %  Auto Monocyte % : 6.0 %  Auto Eosinophil % : 23.0 %  Auto Basophil % : x        140  |  103  |  17  ----------------------------<  164<H>  4.3   |  24  |  1.16    Ca    9.7      01 May 2018 02:33    TPro  7.7  /  Alb  4.0  /  TBili  0.3  /  DBili  x   /  AST  16  /  ALT  16  /  AlkPhos  72      PT/INR - ( 01 May 2018 02:31 )   PT: 12.6 sec;   INR: 1.15 ratio         PTT - ( 01 May 2018 02:31 )  PTT:28.5 sec  LIVER FUNCTIONS - ( 01 May 2018 02:33 )  Alb: 4.0 g/dL / Pro: 7.7 g/dL / ALK PHOS: 72 U/L / ALT: 16 U/L / AST: 16 U/L / GGT: x           Trop: 0.06 -> 0.06  CKMB: 1.5 -> 1.2    EK2018: NSR, RBBB, TWI II, III, AF, V4-V6, overall unchanged when compared to prior EKG 2018    Telemetry: NSR, SB 50-60's    CXR: Clear Lungs    TTE: 2015:  EF (Teicholtz): 56 %  Doppler Peak Velocity (m/sec): AoV=1.2  ------------------------------------------------------------------------  Observations:  Mitral Valve: Mitral annular calcification, otherwise  normal mitral valve. Minimal mitral regurgitation.  Aortic Valve/Aorta: Normal trileaflet aortic valve. Peak  left ventricular outflow tract gradient equals 2 mm Hg.  Aortic Root: 3.6 cm.  Left Atrium: Normal left atrium.  LA volume index = 26  cc/m2.  Left Ventricle: Normal left ventricular systolic function.  No segmental wall motion abnormalities. Normal left  ventricular internal dimensions and wall thicknesses. Mild  diastolic dysfunction (Stage I).  Right Heart: Normal right atrium. The right ventricle is  not well visualized; grossly normal right ventricular  systolic function. Normal tricuspid valve. Normal pulmonic  valve. Mild pulmonic regurgitation.  Pericardium/Pleura: Normal pericardium with no pericardial  effusion.  ------------------------------------------------------------------------  Conclusions:  1. Normal left ventricular systolic function. No segmental  wall motion abnormalities.  2. Mild diastolic dysfunction (Stage I).  3. The right ventricle is not well visualized; grossly  normal right ventricular systolic function.  *** No previous Echo exam.    Pharmacologic MPI 2015:  NUCLEAR FINDINGS:  The left ventricle was normal in size. Normal myocardial  perfusion scan, with no evidence of infarction or  inducible ischemia.  ------------------------------------------------------------------------  GATED ANALYSIS:  Post-stress gated wall motion analysis was performed (LVEF  = 55 %;LVEDV = 91 ml.)  ------------------------------------------------------------------------  IMPRESSIONS:Normal Study  * Chest Pain: No chest pain with administration of  Regadenoson.  * Symptom: No Symptom.  * HR Response: Appropriate.  * BP Response: Appropriate.  * Heart Rhythm: Sinus Bradycardia - 50 BPM.  * ECG Abnormalities: None.  * Arrhythmia: None.  * The left ventricle was normal in size. Normal myocardial  perfusion scan, with no evidence of infarction or  inducible ischemia.  * Post-stress gated wall motion analysis was performed  (LVEF = 55 %;LVEDV = 91 ml.)  * No previous Nuclear/Stress exam.        A/P: 72y.o. Male with DM, HTN, +FH (Father with MI@44, Brother with MI before 50), admitted 2018 with substernal chest pain, 6-7/10, pressure like, radiating to left arm, intermittent in intensity, present since 11:30 PM last night.    1. Chest Pain - [place] male with multiple risk factors including DM, HTN, +Family History (Brother + Father with early MI), now presenting with progressive chest pain at rest, substernal, radiating to left arm, concerning for unstable angina. Differential includes PE (Recent flight), pericarditis/pleuritis in the setting of recent flu like syndrome, hypertensive heart syndrome (/94). However given multiple risk factors, will treat empirically for UA/ACS at this time.  -Please start Heparin GTT  -Serial EKG if progression in chest pain or pain changes in quality  -Cont ASA 81 mg po QD  -Load with Plavix 300 mg po QD, then Plavix 75 mg po QD  -Start Atorvastatin 80 mg po first dose now, then po QHS  -Hold Beta blockade as HR 50-60's.  -Monitor on Telemetry  -Please order routine TTE  -Please order Hemoglobin A1c, Lipid Panel, TSH, BNP  -Please keep NPO for possible LHC in AM. Will discuss with interventional team    2. HTN -  -Cont Captopril 12.5 mg po QD    3. DM - Hold Metformin  -RUSLAN    4. HL -   Atorvastatin 80 mg po QHS as above  	  Thank you for this interesting consult. My team will continue to follow along with you.    Ac Guadarrama MD  Cardiology Attending  [facility]   Cell: [phone]  (Cardiology Nocturnist cell number available 7 pm - 7 am every night; available daytime week days for follow-up only; daytime weekends covered by general cardiology consult service)

## 2018-05-01 NOTE — ED PROVIDER NOTE - ATTENDING CONTRIBUTION TO CARE
73 yo male with CP radiating to L arm; had similar episode in [facility] 2 weeks ago but did not seek medical care.  Also reportedly had a similar episode 3 years ago, seen here with stress test and discharge.  Still with some mild residual discomfort on exam.  Will give ASA x 4, check labs, admit for r/o ACS.  Not STEMI, not PE.

## 2018-05-02 LAB
ALBUMIN SERPL ELPH-MCNC: 3.6 G/DL — SIGNIFICANT CHANGE UP (ref 3.3–5)
ALP SERPL-CCNC: 69 U/L — SIGNIFICANT CHANGE UP (ref 40–120)
ALT FLD-CCNC: 14 U/L — SIGNIFICANT CHANGE UP (ref 10–45)
ANION GAP SERPL CALC-SCNC: 11 MMOL/L — SIGNIFICANT CHANGE UP (ref 5–17)
ANION GAP SERPL CALC-SCNC: 12 MMOL/L — SIGNIFICANT CHANGE UP (ref 5–17)
AST SERPL-CCNC: 24 U/L — SIGNIFICANT CHANGE UP (ref 10–40)
BILIRUB SERPL-MCNC: 0.4 MG/DL — SIGNIFICANT CHANGE UP (ref 0.2–1.2)
BUN SERPL-MCNC: 13 MG/DL — SIGNIFICANT CHANGE UP (ref 7–23)
BUN SERPL-MCNC: 14 MG/DL — SIGNIFICANT CHANGE UP (ref 7–23)
CALCIUM SERPL-MCNC: 9.3 MG/DL — SIGNIFICANT CHANGE UP (ref 8.4–10.5)
CALCIUM SERPL-MCNC: 9.4 MG/DL — SIGNIFICANT CHANGE UP (ref 8.4–10.5)
CHLORIDE SERPL-SCNC: 100 MMOL/L — SIGNIFICANT CHANGE UP (ref 96–108)
CHLORIDE SERPL-SCNC: 101 MMOL/L — SIGNIFICANT CHANGE UP (ref 96–108)
CHOLEST SERPL-MCNC: 160 MG/DL — SIGNIFICANT CHANGE UP (ref 10–199)
CK MB BLD-MCNC: 10.2 % — HIGH (ref 0–3.5)
CK MB CFR SERPL CALC: 13.2 NG/ML — HIGH (ref 0–6.7)
CK SERPL-CCNC: 129 U/L — SIGNIFICANT CHANGE UP (ref 30–200)
CO2 SERPL-SCNC: 24 MMOL/L — SIGNIFICANT CHANGE UP (ref 22–31)
CO2 SERPL-SCNC: 24 MMOL/L — SIGNIFICANT CHANGE UP (ref 22–31)
CREAT SERPL-MCNC: 1.05 MG/DL — SIGNIFICANT CHANGE UP (ref 0.5–1.3)
CREAT SERPL-MCNC: 1.11 MG/DL — SIGNIFICANT CHANGE UP (ref 0.5–1.3)
GLUCOSE BLDC GLUCOMTR-MCNC: 120 MG/DL — HIGH (ref 70–99)
GLUCOSE BLDC GLUCOMTR-MCNC: 138 MG/DL — HIGH (ref 70–99)
GLUCOSE BLDC GLUCOMTR-MCNC: 163 MG/DL — HIGH (ref 70–99)
GLUCOSE SERPL-MCNC: 111 MG/DL — HIGH (ref 70–99)
GLUCOSE SERPL-MCNC: 164 MG/DL — HIGH (ref 70–99)
HCT VFR BLD CALC: 42.2 % — SIGNIFICANT CHANGE UP (ref 39–50)
HDLC SERPL-MCNC: 34 MG/DL — LOW (ref 40–125)
HGB BLD-MCNC: 13.7 G/DL — SIGNIFICANT CHANGE UP (ref 13–17)
LIPID PNL WITH DIRECT LDL SERPL: 106 MG/DL — SIGNIFICANT CHANGE UP
MAGNESIUM SERPL-MCNC: 2.1 MG/DL — SIGNIFICANT CHANGE UP (ref 1.6–2.6)
MCHC RBC-ENTMCNC: 28 PG — SIGNIFICANT CHANGE UP (ref 27–34)
MCHC RBC-ENTMCNC: 32.6 GM/DL — SIGNIFICANT CHANGE UP (ref 32–36)
MCV RBC AUTO: 86 FL — SIGNIFICANT CHANGE UP (ref 80–100)
PHOSPHATE SERPL-MCNC: 3.3 MG/DL — SIGNIFICANT CHANGE UP (ref 2.5–4.5)
PLATELET # BLD AUTO: 254 K/UL — SIGNIFICANT CHANGE UP (ref 150–400)
POTASSIUM SERPL-MCNC: 4.2 MMOL/L — SIGNIFICANT CHANGE UP (ref 3.5–5.3)
POTASSIUM SERPL-MCNC: 4.2 MMOL/L — SIGNIFICANT CHANGE UP (ref 3.5–5.3)
POTASSIUM SERPL-SCNC: 4.2 MMOL/L — SIGNIFICANT CHANGE UP (ref 3.5–5.3)
POTASSIUM SERPL-SCNC: 4.2 MMOL/L — SIGNIFICANT CHANGE UP (ref 3.5–5.3)
PROT SERPL-MCNC: 7.2 G/DL — SIGNIFICANT CHANGE UP (ref 6–8.3)
RBC # BLD: 4.9 M/UL — SIGNIFICANT CHANGE UP (ref 4.2–5.8)
RBC # FLD: 13 % — SIGNIFICANT CHANGE UP (ref 10.3–14.5)
SODIUM SERPL-SCNC: 136 MMOL/L — SIGNIFICANT CHANGE UP (ref 135–145)
SODIUM SERPL-SCNC: 136 MMOL/L — SIGNIFICANT CHANGE UP (ref 135–145)
TOTAL CHOLESTEROL/HDL RATIO MEASUREMENT: 4.7 RATIO — SIGNIFICANT CHANGE UP (ref 3.4–9.6)
TRIGL SERPL-MCNC: 99 MG/DL — SIGNIFICANT CHANGE UP (ref 10–149)
TROPONIN T SERPL-MCNC: 0.38 NG/ML — HIGH (ref 0–0.06)
TSH SERPL-MCNC: 1.3 UIU/ML — SIGNIFICANT CHANGE UP (ref 0.27–4.2)
WBC # BLD: 10.6 K/UL — HIGH (ref 3.8–10.5)
WBC # FLD AUTO: 10.6 K/UL — HIGH (ref 3.8–10.5)

## 2018-05-02 PROCEDURE — 99233 SBSQ HOSP IP/OBS HIGH 50: CPT | Mod: GC

## 2018-05-02 PROCEDURE — 93306 TTE W/DOPPLER COMPLETE: CPT | Mod: 26

## 2018-05-02 PROCEDURE — 93010 ELECTROCARDIOGRAM REPORT: CPT

## 2018-05-02 PROCEDURE — 92929: CPT | Mod: LD

## 2018-05-02 PROCEDURE — 99152 MOD SED SAME PHYS/QHP 5/>YRS: CPT

## 2018-05-02 PROCEDURE — 92928 PRQ TCAT PLMT NTRAC ST 1 LES: CPT | Mod: LD

## 2018-05-02 RX ORDER — LISINOPRIL 2.5 MG/1
2.5 TABLET ORAL DAILY
Qty: 0 | Refills: 0 | Status: DISCONTINUED | OUTPATIENT
Start: 2018-05-02 | End: 2018-05-04

## 2018-05-02 RX ORDER — INSULIN GLARGINE 100 [IU]/ML
10 INJECTION, SOLUTION SUBCUTANEOUS AT BEDTIME
Qty: 0 | Refills: 0 | Status: DISCONTINUED | OUTPATIENT
Start: 2018-05-02 | End: 2018-05-04

## 2018-05-02 RX ORDER — INSULIN GLARGINE 100 [IU]/ML
10 INJECTION, SOLUTION SUBCUTANEOUS AT BEDTIME
Qty: 0 | Refills: 0 | Status: DISCONTINUED | OUTPATIENT
Start: 2018-05-02 | End: 2018-05-02

## 2018-05-02 RX ORDER — INSULIN GLARGINE 100 [IU]/ML
12 INJECTION, SOLUTION SUBCUTANEOUS AT BEDTIME
Qty: 0 | Refills: 0 | Status: DISCONTINUED | OUTPATIENT
Start: 2018-05-02 | End: 2018-05-02

## 2018-05-02 RX ORDER — LISINOPRIL 2.5 MG/1
2.5 TABLET ORAL DAILY
Qty: 0 | Refills: 0 | Status: DISCONTINUED | OUTPATIENT
Start: 2018-05-02 | End: 2018-05-02

## 2018-05-02 RX ORDER — INSULIN LISPRO 100/ML
3 VIAL (ML) SUBCUTANEOUS
Qty: 0 | Refills: 0 | Status: DISCONTINUED | OUTPATIENT
Start: 2018-05-02 | End: 2018-05-04

## 2018-05-02 RX ADMIN — LISINOPRIL 2.5 MILLIGRAM(S): 2.5 TABLET ORAL at 14:03

## 2018-05-02 RX ADMIN — CLOPIDOGREL BISULFATE 75 MILLIGRAM(S): 75 TABLET, FILM COATED ORAL at 11:48

## 2018-05-02 RX ADMIN — INSULIN GLARGINE 10 UNIT(S): 100 INJECTION, SOLUTION SUBCUTANEOUS at 22:04

## 2018-05-02 RX ADMIN — ATORVASTATIN CALCIUM 80 MILLIGRAM(S): 80 TABLET, FILM COATED ORAL at 21:02

## 2018-05-02 RX ADMIN — Medication 3 UNIT(S): at 16:12

## 2018-05-02 RX ADMIN — Medication 650 MILLIGRAM(S): at 15:38

## 2018-05-02 RX ADMIN — Medication 1: at 08:43

## 2018-05-02 RX ADMIN — Medication 650 MILLIGRAM(S): at 14:51

## 2018-05-02 RX ADMIN — Medication 650 MILLIGRAM(S): at 05:47

## 2018-05-02 RX ADMIN — Medication 650 MILLIGRAM(S): at 06:30

## 2018-05-02 RX ADMIN — Medication 81 MILLIGRAM(S): at 11:48

## 2018-05-02 RX ADMIN — CAPTOPRIL 12.5 MILLIGRAM(S): 12.5 TABLET ORAL at 05:44

## 2018-05-02 RX ADMIN — Medication 3 UNIT(S): at 08:43

## 2018-05-02 NOTE — PROGRESS NOTE ADULT - ASSESSMENT
72 m w/pmh T2DM , HTN   p/w atypical chest pain , cardiac enzymes negative x 3, in the setting of unstable angina. Now s/p cath with 100%  occlusion distal RCA s/p VIGNESH x 1, 90% occlusion proximal RPL s/p VIGNESH x 1,  and 95% occlusion to proximal RPDA s/p DEX x 1. Occlusions were also noted in prox LAD and prox diagonal, with staged PCI of LAD/Diagonal planned for tomorrow.     CV:   Problem: Unstable Angina  -VIGNESH RCA x 1, RPL x1, RPDA x 1, as above  -Staged PCI of LAD/Diag today  -Continue with ASA and Plavix  -d/manny captopril in light of softer BPs; initiated lisinopril 2.5 mg daily, uptitrate to 5 mg if tolerated  - BB once HR tolerates    Problem: Hypertension  -initiated lisinopril, as above  -Hydralazine IV if hypertension persists  -Will not start BB now given slower HR     Pulm:  -No active issues  -Monitor for signs of overload    GI:   -No active issues  -Carb controlled/DASH diet    :   -No active issues  -continue monitoring creatinine     Endocrine:   Problem: T2DM  -HgbA1C of 8, not far from target of 7.5 for an elderly patient  -Holding home metformin  -Low dose ISS  -Given hyperglycemia, started lantus 10 u qhs and humalog 3 u tid-ac    Heme:   Problem: Eosinophilia  -f/u strongyloides ab  -stool O&P    ID:   -No active issues    DVT ppx:   -SCDs

## 2018-05-02 NOTE — CHART NOTE - NSCHARTNOTEFT_GEN_A_CORE
====================  CCU MIDNIGHT ROUNDS  ====================    KEVIN SPARROW  [id number]    ====================  SUMMARY: HPI:  Patient is a 72 year old [place]ian male with a past medical history significant for DM type II , HTN  presents with chest pain  for one day, pain is 4/10 in severity ,  localized to the left side of the chest, intermittent, lasting about 10 minutes occurring over about an hour , radiating to the left arm, feels like heaviness, associated with shortness of breath, palpitations, and  left arm numbness and tingling. Per patient’s daughter, patient had similar symptoms two weeks prior to admission while performing moderate activity. Symptoms were followed by fever and a few days of flu like symptoms, which have since resolved. Patient was now on a flight to [place] for vacation when symptoms recurred mid flight. He had a similar presentation a few years ago. Nuclear stress test at that time was normal.  Patient has a strong family history for MI including father  in 40's  and brother  in mid 30's. (01 May 2018 06:29)    ====================        ====================  NEW EVENTS: s/p PCI to RCA, R radial band removed. Pt without symptoms.   ====================        ====================  VITALS (Last 12 hrs):  ====================    T(C): 36.7 (05-01-18 @ 23:00), Max: 36.9 (05-01-18 @ 16:00)  HR: 56 (05-02-18 @ 00:00) (52 - 64)  BP: 100/64 (05-02-18 @ 00:00) (97/56 - 173/88)  BP(mean): 75 (05-02-18 @ 00:00) (69 - 128)  RR: 19 (05-02-18 @ 00:00) (12 - 19)  SpO2: 96% (05-02-18 @ 00:00) (96% - 99%)      TELEMETRY: sinus cassidy 50s.        I&O's Summary    30 Apr 2018 07:01  -  01 May 2018 07:00  --------------------------------------------------------  IN: 0 mL / OUT: 600 mL / NET: -600 mL    01 May 2018 07:01  -  02 May 2018 00:35  --------------------------------------------------------  IN: 520 mL / OUT: 1200 mL / NET: -680 mL        ====================  PLAN:  -unstable angina- CE negative.  s/p PCI to RCA with LAD disease planning staged procedure. R radial band removed. On DAPT, statin, ACE. TTE in AM. Change captopril to lisinopril prior to d/c.  ====================    HEALTH ISSUES - PROBLEM Dx:  Eosinophilia: Eosinophilia  Hypertension: Hypertension  Diabetes: Diabetes  Unstable angina: Unstable angina      Karissa Brink, CCU PA #[id number]

## 2018-05-02 NOTE — CHART NOTE - NSCHARTNOTEFT_GEN_A_CORE
Removal of Radial Band    Pulses in the right upper extremity are palpable. The patient was placed in the supine position. The insertion site was identified and the band deflated per protocol. The radial band was removed slowly. Direct pressure was applied for 5 minutes.    Monitoring of the right wrists and both upper extremities including neuro-vascular checks and vital signs every 15 minutes x 4.    Complications: None    Comments: No bleeding, ecchymosis or hematoma. Pressure dressing applied. Patient instructed to keep arm relatively still. No soaking, scrubbing, or applying lotion/creams to the area.

## 2018-05-02 NOTE — PROGRESS NOTE ADULT - SUBJECTIVE AND OBJECTIVE BOX
Patient is a 72y old  Male who presents with a chief complaint of chest pain x one day (01 May 2018 06:29)      Events 	    MEDICATIONS  (STANDING):  aspirin enteric coated 81 milliGRAM(s) Oral daily  atorvastatin 80 milliGRAM(s) Oral daily  clopidogrel Tablet 75 milliGRAM(s) Oral daily  dextrose 5%. 1000 milliLiter(s) (50 mL/Hr) IV Continuous <Continuous>  dextrose 50% Injectable 25 Gram(s) IV Push once  insulin glargine Injectable (LANTUS) 10 Unit(s) SubCutaneous at bedtime  insulin lispro (HumaLOG) corrective regimen sliding scale   SubCutaneous three times a day before meals  insulin lispro (HumaLOG) corrective regimen sliding scale   SubCutaneous at bedtime  insulin lispro Injectable (HumaLOG) 3 Unit(s) SubCutaneous three times a day before meals  lisinopril 2.5 milliGRAM(s) Oral daily    MEDICATIONS  (PRN):  acetaminophen   Tablet. 650 milliGRAM(s) Oral every 6 hours PRN Mild Pain (1 - 3)  dextrose Gel 1 Dose(s) Oral once PRN Blood Glucose LESS THAN 70 milliGRAM(s)/deciliter  glucagon  Injectable 1 milliGRAM(s) IntraMuscular once PRN Glucose LESS THAN 70 milligrams/deciliter      REVIEW OF SYSTEMS:  Otherwise, 10 point ROS done and otherwise negative.      Vital Signs Last 24 Hrs  T(C): 36.5 (02 May 2018 08:00), Max: 36.9 (01 May 2018 16:00)  T(F): 97.7 (02 May 2018 08:00), Max: 98.5 (01 May 2018 16:00)  HR: 70 (02 May 2018 09:00) (52 - 70)  BP: 128/77 (02 May 2018 09:00) (97/56 - 173/88)  BP(mean): 92 (02 May 2018 09:00) (69 - 132)  RR: 18 (02 May 2018 09:00) (12 - 21)  SpO2: 98% (02 May 2018 09:00) (96% - 100%)  I&O's Summary    01 May 2018 07:01  -  02 May 2018 07:00  --------------------------------------------------------  IN: 660 mL / OUT: 1200 mL / NET: -540 mL    02 May 2018 07:01  -  02 May 2018 10:32  --------------------------------------------------------  IN: 240 mL / OUT: 0 mL / NET: 240 mL      CAPILLARY BLOOD GLUCOSE      POCT Blood Glucose.: 163 mg/dL (02 May 2018 08:32)  POCT Blood Glucose.: 183 mg/dL (01 May 2018 21:39)  POCT Blood Glucose.: 168 mg/dL (01 May 2018 17:33)  POCT Blood Glucose.: 109 mg/dL (01 May 2018 13:39)  POCT Blood Glucose.: 172 mg/dL (01 May 2018 10:44)        PHYSICAL EXAM:  Appearance: Normal	  HEENT:   Normal oral mucosa, PERRL, EOMI	  Lymphatic: No lymphadenopathy  Cardiovascular: Normal S1 S2, No JVD, No murmurs, No edema  Respiratory: Lungs clear to auscultation	  Psychiatry: A & O x 3, Mood & affect appropriate  Gastrointestinal:  Soft, Non-tender, + BS	  Skin: No rashes, No ecchymoses, No cyanosis	  Neurologic: Non-focal  Extremities: Normal range of motion, No clubbing, cyanosis or edema  Vascular: Peripheral pulses palpable 2+ bilaterally    LABS:                        13.7   10.6  )-----------( 254      ( 02 May 2018 00:27 )             42.2                         13.9   12.6  )-----------( 264      ( 01 May 2018 16:31 )             41.9     05-02    136  |  101  |  14  ----------------------------<  164<H>  4.2   |  24  |  1.05  05-01    137  |  102  |  14  ----------------------------<  194<H>  4.7   |  23  |  1.08    Ca    9.3      02 May 2018 00:27  Ca    9.4      01 May 2018 16:31  Phos  3.3     05-02  Mg     2.1     05-02    TPro  7.2  /  Alb  3.6  /  TBili  0.4  /  DBili  x   /  AST  24  /  ALT  14  /  AlkPhos  69  05-02  TPro  7.4  /  Alb  3.6  /  TBili  0.4  /  DBili  x   /  AST  25  /  ALT  14  /  AlkPhos  71  05-01  	 	    PT/INR - ( 01 May 2018 02:31 )   PT: 12.6 sec;   INR: 1.15 ratio         PTT - ( 01 May 2018 02:31 )  PTT:28.5 sec    proBNP:   05-02 Chol 160  HDL 34<L> Trig 99  HgA1c: Hemoglobin A1C, Whole Blood: 8.1 % (05-01 @ 19:44)    TSH: Thyroid Stimulating Hormone, Serum: 1.30 uIU/mL (05-01 @ 17:17)       02 May 2018 00:27 Troponin 0.38 ng/mL /  U/L / CKMB x     / CKMB Units 13.2 ng/mL   01 May 2018 16:31 Troponin 0.09 ng/mL / CK 93 U/L / CKMB x     / CKMB Units 3.6 ng/mL   01 May 2018 10:31 Troponin 0.05 ng/mL / CK 72 U/L / CKMB x     / CKMB Units 1.8 ng/mL        TELEMETRY: 	    ECG:  	  RADIOLOGY:  OTHER: 	    PREVIOUS DIAGNOSTIC TESTING:    [ ] Echocardiogram:  [ ]  Catheterization:  [ ] Stress Test: Patient is a 72y old  Male who presents with a chief complaint of chest pain x one day (01 May 2018 06:29)      Events 	  No acute events ON    MEDICATIONS  (STANDING):  aspirin enteric coated 81 milliGRAM(s) Oral daily  atorvastatin 80 milliGRAM(s) Oral daily  clopidogrel Tablet 75 milliGRAM(s) Oral daily  dextrose 5%. 1000 milliLiter(s) (50 mL/Hr) IV Continuous <Continuous>  dextrose 50% Injectable 25 Gram(s) IV Push once  insulin glargine Injectable (LANTUS) 10 Unit(s) SubCutaneous at bedtime  insulin lispro (HumaLOG) corrective regimen sliding scale   SubCutaneous three times a day before meals  insulin lispro (HumaLOG) corrective regimen sliding scale   SubCutaneous at bedtime  insulin lispro Injectable (HumaLOG) 3 Unit(s) SubCutaneous three times a day before meals  lisinopril 2.5 milliGRAM(s) Oral daily    MEDICATIONS  (PRN):  acetaminophen   Tablet. 650 milliGRAM(s) Oral every 6 hours PRN Mild Pain (1 - 3)  dextrose Gel 1 Dose(s) Oral once PRN Blood Glucose LESS THAN 70 milliGRAM(s)/deciliter  glucagon  Injectable 1 milliGRAM(s) IntraMuscular once PRN Glucose LESS THAN 70 milligrams/deciliter      REVIEW OF SYSTEMS:  Otherwise, 10 point ROS done and otherwise negative.      Vital Signs Last 24 Hrs  T(C): 36.5 (02 May 2018 08:00), Max: 36.9 (01 May 2018 16:00)  T(F): 97.7 (02 May 2018 08:00), Max: 98.5 (01 May 2018 16:00)  HR: 70 (02 May 2018 09:00) (52 - 70)  BP: 128/77 (02 May 2018 09:00) (97/56 - 173/88)  BP(mean): 92 (02 May 2018 09:00) (69 - 132)  RR: 18 (02 May 2018 09:00) (12 - 21)  SpO2: 98% (02 May 2018 09:00) (96% - 100%)  I&O's Summary    01 May 2018 07:01  -  02 May 2018 07:00  --------------------------------------------------------  IN: 660 mL / OUT: 1200 mL / NET: -540 mL    02 May 2018 07:01  -  02 May 2018 10:32  --------------------------------------------------------  IN: 240 mL / OUT: 0 mL / NET: 240 mL      CAPILLARY BLOOD GLUCOSE      POCT Blood Glucose.: 163 mg/dL (02 May 2018 08:32)  POCT Blood Glucose.: 183 mg/dL (01 May 2018 21:39)  POCT Blood Glucose.: 168 mg/dL (01 May 2018 17:33)  POCT Blood Glucose.: 109 mg/dL (01 May 2018 13:39)  POCT Blood Glucose.: 172 mg/dL (01 May 2018 10:44)        PHYSICAL EXAM:  Appearance: Normal	  HEENT:   Normal oral mucosa, PERRL, EOMI	  Lymphatic: No lymphadenopathy  Cardiovascular: Normal S1 S2, No JVD, No murmurs, No edema  Respiratory: Lungs clear to auscultation	  Psychiatry: A & O x 3, Mood & affect appropriate  Gastrointestinal:  Soft, Non-tender, + BS	  Skin: No rashes, No ecchymoses, No cyanosis	  Neurologic: Non-focal  Extremities: Normal range of motion, No clubbing, cyanosis or edema  Vascular: Peripheral pulses palpable 2+ bilaterally    LABS:                        13.7   10.6  )-----------( 254      ( 02 May 2018 00:27 )             42.2                         13.9   12.6  )-----------( 264      ( 01 May 2018 16:31 )             41.9     05-02    136  |  101  |  14  ----------------------------<  164<H>  4.2   |  24  |  1.05  05-01    137  |  102  |  14  ----------------------------<  194<H>  4.7   |  23  |  1.08    Ca    9.3      02 May 2018 00:27  Ca    9.4      01 May 2018 16:31  Phos  3.3     05-02  Mg     2.1     05-02    TPro  7.2  /  Alb  3.6  /  TBili  0.4  /  DBili  x   /  AST  24  /  ALT  14  /  AlkPhos  69  05-02  TPro  7.4  /  Alb  3.6  /  TBili  0.4  /  DBili  x   /  AST  25  /  ALT  14  /  AlkPhos  71  05-01  	 	    PT/INR - ( 01 May 2018 02:31 )   PT: 12.6 sec;   INR: 1.15 ratio         PTT - ( 01 May 2018 02:31 )  PTT:28.5 sec    proBNP:   05-02 Chol 160  HDL 34<L> Trig 99  HgA1c: Hemoglobin A1C, Whole Blood: 8.1 % (05-01 @ 19:44)    TSH: Thyroid Stimulating Hormone, Serum: 1.30 uIU/mL (05-01 @ 17:17)       02 May 2018 00:27 Troponin 0.38 ng/mL /  U/L / CKMB x     / CKMB Units 13.2 ng/mL   01 May 2018 16:31 Troponin 0.09 ng/mL / CK 93 U/L / CKMB x     / CKMB Units 3.6 ng/mL   01 May 2018 10:31 Troponin 0.05 ng/mL / CK 72 U/L / CKMB x     / CKMB Units 1.8 ng/mL        TELEMETRY: 	    ECG:  	  RADIOLOGY:  OTHER: 	    PREVIOUS DIAGNOSTIC TESTING:    [ ] Echocardiogram:  [ ]  Catheterization:  [ ] Stress Test:

## 2018-05-02 NOTE — CHART NOTE - NSCHARTNOTEFT_GEN_A_CORE
CCU course:    72 m w/pmh T2DM , HTN  p/w atypical chest pain , cardiac enzymes negative x 3, in the setting of unstable angina. Now s/p cath with 100% occlusion distal RCA s/p VIGNESH x 1, 90% occlusion proximal RPL s/p VIGNESH x 1,  and 95% occlusion to proximal RPDA s/p DEX x 1. Occlusions were also noted in prox LAD and prox diagonal, and patient underwent staged PCI with 1 stent to pLAD today. Patient started on ASA, plavix, atorvastatin and lisinopril.       MEDICATIONS:  acetaminophen   Tablet. 650 milliGRAM(s) Oral every 6 hours PRN  aspirin enteric coated 81 milliGRAM(s) Oral daily  atorvastatin 80 milliGRAM(s) Oral daily  clopidogrel Tablet 75 milliGRAM(s) Oral daily  dextrose 5%. 1000 milliLiter(s) IV Continuous <Continuous>  dextrose 50% Injectable 25 Gram(s) IV Push once  dextrose Gel 1 Dose(s) Oral once PRN  glucagon  Injectable 1 milliGRAM(s) IntraMuscular once PRN  insulin glargine Injectable (LANTUS) 10 Unit(s) SubCutaneous at bedtime  insulin lispro (HumaLOG) corrective regimen sliding scale   SubCutaneous three times a day before meals  insulin lispro (HumaLOG) corrective regimen sliding scale   SubCutaneous at bedtime  insulin lispro Injectable (HumaLOG) 3 Unit(s) SubCutaneous three times a day before meals  lisinopril 2.5 milliGRAM(s) Oral daily      ALLERGIES:  No Known Allergies    OBJECTIVE:  VITAL SIGNS:  ICU Vital Signs Last 24 Hrs  T(C): 37.4 (02 May 2018 19:00), Max: 37.4 (02 May 2018 19:00)  T(F): 99.3 (02 May 2018 19:00), Max: 99.3 (02 May 2018 19:00)  HR: 68 (02 May 2018 19:30) (54 - 70)  BP: 116/74 (02 May 2018 19:30) (97/56 - 149/93)  BP(mean): 93 (02 May 2018 19:30) (69 - 112)  ABP: --  ABP(mean): --  RR: 20 (02 May 2018 19:30) (12 - 21)  SpO2: 96% (02 May 2018 19:30) (95% - 99%)        05-01 @ 07:01  -  05-02 @ 07:00  --------------------------------------------------------  IN: 660 mL / OUT: 1200 mL / NET: -540 mL    05-02 @ 07:01  -  05-02 @ 19:55  --------------------------------------------------------  IN: 480 mL / OUT: 500 mL / NET: -20 mL      CAPILLARY BLOOD GLUCOSE      POCT Blood Glucose.: 120 mg/dL (02 May 2018 16:03)    PHYSICAL EXAM:  Appearance: Normal	  HEENT:   Normal oral mucosa, PERRL, EOMI	  Lymphatic: No lymphadenopathy  Cardiovascular: Normal S1 S2, No JVD, No murmurs, No edema  Respiratory: Lungs clear to auscultation	  Psychiatry: A & O x 3, Mood & affect appropriate  Gastrointestinal:  Soft, Non-tender, + BS	  Skin: No rashes, No ecchymoses, No cyanosis	  Neurologic: Non-focal  Extremities: Normal range of motion, No clubbing, cyanosis or edema  Vascular: Peripheral pulses palpable 2+ bilaterally    LABS:                        13.7   10.6  )-----------( 254      ( 02 May 2018 00:27 )             42.2     05-02    136  |  100  |  13  ----------------------------<  111<H>  4.2   |  24  |  1.11    Ca    9.4      02 May 2018 16:38  Phos  3.3     05-02  Mg     2.1     05-02    TPro  7.2  /  Alb  3.6  /  TBili  0.4  /  DBili  x   /  AST  24  /  ALT  14  /  AlkPhos  69  05-02    LIVER FUNCTIONS - ( 02 May 2018 00:27 )  Alb: 3.6 g/dL / Pro: 7.2 g/dL / ALK PHOS: 69 U/L / ALT: 14 U/L / AST: 24 U/L / GGT: x           PT/INR - ( 01 May 2018 02:31 )   PT: 12.6 sec;   INR: 1.15 ratio         PTT - ( 01 May 2018 02:31 )  PTT:28.5 sec    CARDIAC MARKERS ( 02 May 2018 00:27 )  x     / 0.38 ng/mL / 129 U/L / x     / 13.2 ng/mL  CARDIAC MARKERS ( 01 May 2018 16:31 )  x     / 0.09 ng/mL / 93 U/L / x     / 3.6 ng/mL  CARDIAC MARKERS ( 01 May 2018 10:31 )  x     / 0.05 ng/mL / 72 U/L / x     / 1.8 ng/mL  CARDIAC MARKERS ( 01 May 2018 05:25 )  x     / 0.06 ng/mL / 81 U/L / x     / 1.2 ng/mL  CARDIAC MARKERS ( 01 May 2018 02:33 )  x     / 0.06 ng/mL / 79 U/L / x     / 1.5 ng/mL    72 m w/pmh T2DM , HTN   p/w atypical chest pain , cardiac enzymes negative x 3, in the setting of unstable angina. Now s/p cath with 100%  occlusion distal RCA s/p VIGNESH x 1, 90% occlusion proximal RPL s/p VIGNESH x 1,  and 95% occlusion to proximal RPDA s/p DEX x 1. Occlusions were also noted in prox LAD and prox diagonal, with staged PCI of LAD/Diagonal planned for tomorrow.     CV:   Problem: Unstable Angina  -VIGNESH RCA x 1, RPL x1, RPDA x 1, as above  -Staged PCI with VIGNESH X1 to pLAD today   -Continue with ASA and Plavix  - d/manny captopril in light of softer BPs; initiated lisinopril 2.5 mg daily, uptitrate to 5 mg if tolerated  - BB once HR tolerates    Problem: Hypertension  -initiated lisinopril, as above, uptitrate to 5 mg if tolerated  - BB once HR tolerates      GI:   -No active issues  -Carb controlled/DASH diet    Endocrine:   Problem: T2DM  -HgbA1C of 8, not far from target of 7.5 for an elderly patient  -Holding home metformin  -Low dose ISS  -Given hyperglycemia, started lantus 10 u qhs and humalog 3 u tid-ac    Heme:   Problem: Eosinophilia  -f/u strongyloides ab  -stool O&P    DVT ppx:   -SCDs      Kellie Curiel M.D.   PGY-1 | Internal Medicine   [phone] | [id number] CCU course:    72 m w/pmh T2DM , HTN  p/w atypical chest pain. Cardiac enzymes negative x 3, in the setting of unstable angina. Patient underwent urgent cath with 100% occlusion distal RCA s/p VIGNESH x 1, 90% occlusion proximal RPL s/p VIGNESH x 1,  and 95% occlusion to proximal RPDA s/p DEX x 1. Occlusions were also noted in prox LAD and prox diagonal, and patient underwent staged PCI with 1 stent to pLAD today. Patient started on ASA, plavix, atorvastatin and lisinopril.       MEDICATIONS:  acetaminophen   Tablet. 650 milliGRAM(s) Oral every 6 hours PRN  aspirin enteric coated 81 milliGRAM(s) Oral daily  atorvastatin 80 milliGRAM(s) Oral daily  clopidogrel Tablet 75 milliGRAM(s) Oral daily  dextrose 5%. 1000 milliLiter(s) IV Continuous <Continuous>  dextrose 50% Injectable 25 Gram(s) IV Push once  dextrose Gel 1 Dose(s) Oral once PRN  glucagon  Injectable 1 milliGRAM(s) IntraMuscular once PRN  insulin glargine Injectable (LANTUS) 10 Unit(s) SubCutaneous at bedtime  insulin lispro (HumaLOG) corrective regimen sliding scale   SubCutaneous three times a day before meals  insulin lispro (HumaLOG) corrective regimen sliding scale   SubCutaneous at bedtime  insulin lispro Injectable (HumaLOG) 3 Unit(s) SubCutaneous three times a day before meals  lisinopril 2.5 milliGRAM(s) Oral daily      ALLERGIES:  No Known Allergies    OBJECTIVE:  VITAL SIGNS:  ICU Vital Signs Last 24 Hrs  T(C): 37.4 (02 May 2018 19:00), Max: 37.4 (02 May 2018 19:00)  T(F): 99.3 (02 May 2018 19:00), Max: 99.3 (02 May 2018 19:00)  HR: 68 (02 May 2018 19:30) (54 - 70)  BP: 116/74 (02 May 2018 19:30) (97/56 - 149/93)  BP(mean): 93 (02 May 2018 19:30) (69 - 112)  ABP: --  ABP(mean): --  RR: 20 (02 May 2018 19:30) (12 - 21)  SpO2: 96% (02 May 2018 19:30) (95% - 99%)        05-01 @ 07:01  -  05-02 @ 07:00  --------------------------------------------------------  IN: 660 mL / OUT: 1200 mL / NET: -540 mL    05-02 @ 07:01  -  05-02 @ 19:55  --------------------------------------------------------  IN: 480 mL / OUT: 500 mL / NET: -20 mL      CAPILLARY BLOOD GLUCOSE      POCT Blood Glucose.: 120 mg/dL (02 May 2018 16:03)    PHYSICAL EXAM:  Appearance: Normal	  HEENT:   Normal oral mucosa, PERRL, EOMI	  Lymphatic: No lymphadenopathy  Cardiovascular: Normal S1 S2, No JVD, No murmurs, No edema  Respiratory: Lungs clear to auscultation	  Psychiatry: A & O x 3, Mood & affect appropriate  Gastrointestinal:  Soft, Non-tender, + BS	  Skin: No rashes, No ecchymoses, No cyanosis	  Neurologic: Non-focal  Extremities: Normal range of motion, No clubbing, cyanosis or edema  Vascular: Peripheral pulses palpable 2+ bilaterally    LABS:                        13.7   10.6  )-----------( 254      ( 02 May 2018 00:27 )             42.2     05-02    136  |  100  |  13  ----------------------------<  111<H>  4.2   |  24  |  1.11    Ca    9.4      02 May 2018 16:38  Phos  3.3     05-02  Mg     2.1     05-02    TPro  7.2  /  Alb  3.6  /  TBili  0.4  /  DBili  x   /  AST  24  /  ALT  14  /  AlkPhos  69  05-02    LIVER FUNCTIONS - ( 02 May 2018 00:27 )  Alb: 3.6 g/dL / Pro: 7.2 g/dL / ALK PHOS: 69 U/L / ALT: 14 U/L / AST: 24 U/L / GGT: x           PT/INR - ( 01 May 2018 02:31 )   PT: 12.6 sec;   INR: 1.15 ratio         PTT - ( 01 May 2018 02:31 )  PTT:28.5 sec    CARDIAC MARKERS ( 02 May 2018 00:27 )  x     / 0.38 ng/mL / 129 U/L / x     / 13.2 ng/mL  CARDIAC MARKERS ( 01 May 2018 16:31 )  x     / 0.09 ng/mL / 93 U/L / x     / 3.6 ng/mL  CARDIAC MARKERS ( 01 May 2018 10:31 )  x     / 0.05 ng/mL / 72 U/L / x     / 1.8 ng/mL  CARDIAC MARKERS ( 01 May 2018 05:25 )  x     / 0.06 ng/mL / 81 U/L / x     / 1.2 ng/mL  CARDIAC MARKERS ( 01 May 2018 02:33 )  x     / 0.06 ng/mL / 79 U/L / x     / 1.5 ng/mL      Plan:    CV:   Problem: Unstable Angina  - VIGNESH RCA x 1, RPL x1, RPDA x 1; Staged PCI with VIGNESH X1 to pLAD today   - Continue with ASA, Plavix, statin  - d/manny captopril in light of softer BPs; initiated lisinopril 2.5 mg daily, uptitrate to 5 mg if tolerated  - BB once HR tolerates    Problem: Hypertension  -initiated lisinopril, as above, uptitrate to 5 mg if tolerated  - BB once HR tolerates    GI:   -No active issues  -Carb controlled/DASH diet    Endocrine:   Problem: T2DM  -HgbA1C of 8, not far from target of 7.5 for an elderly patient  -Holding home metformin  -Low dose ISS  -Given hyperglycemia, started lantus 10 u qhs and humalog 3 u tid-ac    Heme:   Problem: Eosinophilia  -f/u strongyloides ab  -stool O&P    DVT ppx:   -SCDs      Kellie Curiel M.D.   PGY-1 | Internal Medicine   [phone] | [id number]

## 2018-05-03 ENCOUNTER — TRANSCRIPTION ENCOUNTER (OUTPATIENT)
Age: 73
End: 2018-05-03

## 2018-05-03 DIAGNOSIS — I25.10 ATHEROSCLEROTIC HEART DISEASE OF NATIVE CORONARY ARTERY WITHOUT ANGINA PECTORIS: ICD-10-CM

## 2018-05-03 DIAGNOSIS — I10 ESSENTIAL (PRIMARY) HYPERTENSION: ICD-10-CM

## 2018-05-03 DIAGNOSIS — E11.9 TYPE 2 DIABETES MELLITUS WITHOUT COMPLICATIONS: ICD-10-CM

## 2018-05-03 LAB
ANION GAP SERPL CALC-SCNC: 11 MMOL/L — SIGNIFICANT CHANGE UP (ref 5–17)
BASOPHILS # BLD AUTO: 0 K/UL — SIGNIFICANT CHANGE UP (ref 0–0.2)
BASOPHILS NFR BLD AUTO: 0 % — SIGNIFICANT CHANGE UP (ref 0–2)
BUN SERPL-MCNC: 13 MG/DL — SIGNIFICANT CHANGE UP (ref 7–23)
CALCIUM SERPL-MCNC: 9.3 MG/DL — SIGNIFICANT CHANGE UP (ref 8.4–10.5)
CHLORIDE SERPL-SCNC: 100 MMOL/L — SIGNIFICANT CHANGE UP (ref 96–108)
CO2 SERPL-SCNC: 24 MMOL/L — SIGNIFICANT CHANGE UP (ref 22–31)
CREAT SERPL-MCNC: 1.16 MG/DL — SIGNIFICANT CHANGE UP (ref 0.5–1.3)
CULTURE RESULTS: SIGNIFICANT CHANGE UP
EOSINOPHIL # BLD AUTO: 4.44 K/UL — HIGH (ref 0–0.5)
EOSINOPHIL NFR BLD AUTO: 42.5 % — HIGH (ref 0–6)
GLUCOSE BLDC GLUCOMTR-MCNC: 119 MG/DL — HIGH (ref 70–99)
GLUCOSE BLDC GLUCOMTR-MCNC: 155 MG/DL — HIGH (ref 70–99)
GLUCOSE SERPL-MCNC: 158 MG/DL — HIGH (ref 70–99)
HCT VFR BLD CALC: 41.6 % — SIGNIFICANT CHANGE UP (ref 39–50)
HGB BLD-MCNC: 13.5 G/DL — SIGNIFICANT CHANGE UP (ref 13–17)
LYMPHOCYTES # BLD AUTO: 1.57 K/UL — SIGNIFICANT CHANGE UP (ref 1–3.3)
LYMPHOCYTES # BLD AUTO: 15 % — SIGNIFICANT CHANGE UP (ref 13–44)
MAGNESIUM SERPL-MCNC: 2.2 MG/DL — SIGNIFICANT CHANGE UP (ref 1.6–2.6)
MCHC RBC-ENTMCNC: 27.7 PG — SIGNIFICANT CHANGE UP (ref 27–34)
MCHC RBC-ENTMCNC: 32.5 GM/DL — SIGNIFICANT CHANGE UP (ref 32–36)
MCV RBC AUTO: 85.2 FL — SIGNIFICANT CHANGE UP (ref 80–100)
MONOCYTES # BLD AUTO: 1.04 K/UL — HIGH (ref 0–0.9)
MONOCYTES NFR BLD AUTO: 10 % — SIGNIFICANT CHANGE UP (ref 2–14)
NEUTROPHILS # BLD AUTO: 3.39 K/UL — SIGNIFICANT CHANGE UP (ref 1.8–7.4)
NEUTROPHILS NFR BLD AUTO: 32.5 % — LOW (ref 43–77)
PHOSPHATE SERPL-MCNC: 3.7 MG/DL — SIGNIFICANT CHANGE UP (ref 2.5–4.5)
PLATELET # BLD AUTO: 250 K/UL — SIGNIFICANT CHANGE UP (ref 150–400)
POTASSIUM SERPL-MCNC: 4.6 MMOL/L — SIGNIFICANT CHANGE UP (ref 3.5–5.3)
POTASSIUM SERPL-SCNC: 4.6 MMOL/L — SIGNIFICANT CHANGE UP (ref 3.5–5.3)
RBC # BLD: 4.88 M/UL — SIGNIFICANT CHANGE UP (ref 4.2–5.8)
RBC # FLD: 15.2 % — HIGH (ref 10.3–14.5)
SODIUM SERPL-SCNC: 135 MMOL/L — SIGNIFICANT CHANGE UP (ref 135–145)
SPECIMEN SOURCE: SIGNIFICANT CHANGE UP
WBC # BLD: 10.44 K/UL — SIGNIFICANT CHANGE UP (ref 3.8–10.5)
WBC # FLD AUTO: 10.44 K/UL — SIGNIFICANT CHANGE UP (ref 3.8–10.5)

## 2018-05-03 PROCEDURE — 99233 SBSQ HOSP IP/OBS HIGH 50: CPT

## 2018-05-03 RX ORDER — METOPROLOL TARTRATE 50 MG
12.5 TABLET ORAL
Qty: 0 | Refills: 0 | Status: DISCONTINUED | OUTPATIENT
Start: 2018-05-03 | End: 2018-05-04

## 2018-05-03 RX ADMIN — Medication 3 UNIT(S): at 12:21

## 2018-05-03 RX ADMIN — Medication 1: at 08:32

## 2018-05-03 RX ADMIN — CLOPIDOGREL BISULFATE 75 MILLIGRAM(S): 75 TABLET, FILM COATED ORAL at 08:31

## 2018-05-03 RX ADMIN — Medication 1: at 17:18

## 2018-05-03 RX ADMIN — Medication 3 UNIT(S): at 17:18

## 2018-05-03 RX ADMIN — Medication 12.5 MILLIGRAM(S): at 17:27

## 2018-05-03 RX ADMIN — Medication 3 UNIT(S): at 08:32

## 2018-05-03 RX ADMIN — Medication 81 MILLIGRAM(S): at 08:31

## 2018-05-03 RX ADMIN — ATORVASTATIN CALCIUM 80 MILLIGRAM(S): 80 TABLET, FILM COATED ORAL at 21:46

## 2018-05-03 RX ADMIN — INSULIN GLARGINE 10 UNIT(S): 100 INJECTION, SOLUTION SUBCUTANEOUS at 21:47

## 2018-05-03 RX ADMIN — LISINOPRIL 2.5 MILLIGRAM(S): 2.5 TABLET ORAL at 05:09

## 2018-05-03 NOTE — DISCHARGE NOTE ADULT - CARE PLAN
Principal Discharge DX:	Coronary artery disease, occlusive  Goal:	risk modification  Assessment and plan of treatment:	Coronary artery disease is a condition where the arteries the supply the heart muscle get clogges with fatty deposits & puts you at risk for a heart attack  Call your doctor if you have any new pain, pressure, or discomfort in the center of your chest, pain, tingling or discomfort in arms, back, neck, jaw, or stomach, shortness of breath, nausea, vomiting, burping or heartburn, sweating, cold and clammy skin, racing or abnormal heartbeat for more than 10 minutes or if they keep coming & going.  Call 911 and do not tr to get to hospital by care  You can help yourself with lefestyle changes (quitting smoking if you smoke), eat lots of fruits & vegetables & low fat dairy products, not a lot of meat & fatty foods, walk or some form of physical activity most days of the week, lose weight if you are overweight  Take your cardiac medication as prescribed to lower cholesterol, to lower blood pressure, aspirin to prevent blood clots, and diabetes control  Make sure to keep appointments with doctor for cardiac follow up care  Secondary Diagnosis:	Stented coronary artery  Goal:	patent stent  Assessment and plan of treatment:	Angioplasty or coronary stenting are procedures to open up narrowed or blocked coronary arteries in the heart.  A stent is a tiny metal tube that helps to prop open an artery in the heart muscle.    Your doctor will instruct you when you can drive or resume usual physical activities  You MUST take aspirin & another agent Plavix, Brilinta) to help prevent clots inside the stent.  It is VERY important to take these medications as directed unless your cardiologist says it is OK to stop.  If another physican advises you to stop them, call cardiologist to discuss this advise since there is a risk of a heart attack or even death stopping these medications earlier than they should be.  Do NOT take more than 81 mg aspirin with Brilinta  The most common problems after coronary stenting is bleeding, bruising, & soreness at the tube insertion site - you can use tylenol for discomfort if not contraindicated  Call your doctor if you have chest pain, fever, pain, swelling, or redness where the tube went in  Secondary Diagnosis:	Hypertension  Goal:	Follow up with your medical doctor to establish long term blood pressure treatment goals.  Assessment and plan of treatment:	Low salt diet  Activity as tolerated.  Take all medication as prescribed.  Follow up with your medical doctor for routine blood pressure monitoring at your next visit.  Notify your doctor if you have any of the following symptoms:   Dizziness, Lightheadedness, Blurry vision, Headache, Chest pain, Shortness of breath  Secondary Diagnosis:	Diabetes  Goal:	disease management  Assessment and plan of treatment:	HgA1C this admission 8.1  Make sure you get your HgA1c checked every three months.  If you take oral diabetes medications, check your blood glucose two times a day.  If you take insulin, check your blood glucose before meals and at bedtime.  It's important not to skip any meals.  Keep a log of your blood glucose results and always take it with you to your doctor appointments.  Keep a list of your current medications including injectables and over the counter medications and bring this medication list with you to all your doctor appointments.  If you have not seen your ophthalmologist this year call for appointment.  Check your feet daily for redness, sores, or openings. Do not self treat. If no improvement in two days call your primary care physician for an appointment.  Low blood sugar (hypoglycemia) is a blood sugar below 70mg/dl. Check your blood sugar if you feel signs/symptoms of hypoglycemia. If your blood sugar is below 70 take 15 grams of carbohydrates (ex 4 oz of apple juice, 3-4 glucose tablets, or 4-6 oz of regular soda) wait 15 minutes and repeat blood sugar to make sure it comes up above 70.  If your blood sugar is above 70 and you are due for a meal, have a meal.  If you are not due for a meal have a snack.  This snack helps keeps your blood sugar at a safe range.

## 2018-05-03 NOTE — PROGRESS NOTE ADULT - SUBJECTIVE AND OBJECTIVE BOX
SUBJ: no chest pain, no SOB , Transferred out of CCU last nigh, had staged PCI to LAD yesterday    MEDICATIONS  (STANDING):  aspirin enteric coated 81 milliGRAM(s) Oral daily  atorvastatin 80 milliGRAM(s) Oral daily  clopidogrel Tablet 75 milliGRAM(s) Oral daily  dextrose 5%. 1000 milliLiter(s) (50 mL/Hr) IV Continuous <Continuous>  dextrose 50% Injectable 25 Gram(s) IV Push once  insulin glargine Injectable (LANTUS) 10 Unit(s) SubCutaneous at bedtime  insulin lispro (HumaLOG) corrective regimen sliding scale   SubCutaneous three times a day before meals  insulin lispro (HumaLOG) corrective regimen sliding scale   SubCutaneous at bedtime  insulin lispro Injectable (HumaLOG) 3 Unit(s) SubCutaneous three times a day before meals  lisinopril 2.5 milliGRAM(s) Oral daily    MEDICATIONS  (PRN):  acetaminophen   Tablet. 650 milliGRAM(s) Oral every 6 hours PRN Mild Pain (1 - 3)  dextrose Gel 1 Dose(s) Oral once PRN Blood Glucose LESS THAN 70 milliGRAM(s)/deciliter  glucagon  Injectable 1 milliGRAM(s) IntraMuscular once PRN Glucose LESS THAN 70 milligrams/deciliter      Vital Signs Last 24 Hrs  T(C): 37.3 (03 May 2018 04:26), Max: 37.4 (02 May 2018 19:00)  T(F): 99.2 (03 May 2018 04:26), Max: 99.3 (02 May 2018 19:00)  HR: 75 (03 May 2018 04:26) (60 - 75)  BP: 105/66 (03 May 2018 04:26) (103/66 - 149/93)  BP(mean): 85 (02 May 2018 23:00) (78 - 112)  RR: 18 (03 May 2018 04:26) (16 - 22)  SpO2: 96% (03 May 2018 04:26) (95% - 99%)    REVIEW OF SYSTEMS:  CONSTITUTIONAL: No fever, or fatigue  EYES: No visual disturbances, or discharge  RESPIRATORY: No cough, wheezing, chills or hemoptysis; No shortness of breath  CARDIOVASCULAR: No chest pain, palpitations, dizziness, or leg swelling  GASTROINTESTINAL: No abdominal or epigastric pain. No nausea, vomiting, or hematemesis;   GENITOURINARY: No dysuria, frequency, hematuria, or incontinence  NEUROLOGICAL: No headaches, memory loss, loss of strength, numbness, or tremors  SKIN: No itching, burning, rashes, or lesions   ENDOCRINE: No heat or cold intolerance; No hair loss  MUSCULOSKELETAL: No joint pain or swelling; No muscle, back, or extremity pain      PHYSICAL EXAM:  · CONSTITUTIONAL: M lying in bed NAD      · EYES:  no drainage or redness  · NECK: No bruits; no JVD  ·RESPIRATORY:   airway patent; breath sounds equal; good air movement; respirations non-labored; clear to auscultation bilaterally; no chest wall tenderness; no intercostal retractions; no rales,rhonchi or wheeze  · CARDIOVASCULAR: regular rate and rhythm  no rub  no murmur  normal PMI  . GASTROINTESTINAL:  no distention; no masses palpable; bowel sounds normal  · EXTREMITIES: No cyanosis, clubbing or edema  · VASCULAR:  Equal and normal pulses (radial, femoral), Rt radial + bandage +2 radial pulse  ·NEUROLOGICAL:  Alert and oriented x 3;   · SKIN: No lesions; no rash  · MUSCULOSKELETAL:  No calf tenderness  no joint swelling	    TELEMETRY:SR 60-80, no events      TTE:EF (Visual Estimate): 45 %  Doppler Peak Velocity (m/sec): AoV=1.0 PV=1.0  ------------------------------------------------------------------------  Observations:  Mitral Valve: Mitral annular calcification. Tethered mitral  valve leaflets. The posterior mitral valve leaflet is  severely restricted.  Mild mitral regurgitation.  Aortic Valve/Aorta: Calcified trileaflet aortic valve with  normal opening. Peak transaortic valve gradient equals 4 mm  Hg. No aortic valve regurgitation seen. Peak left  ventricular outflow tract gradient equals 2 mm Hg, mean  gradient is equal to 1 mm Hg, LVOT velocity time integral  equals 15 cm.  Aortic Root: 3 cm.  LVOT diameter: 2 cm.  Left Atrium: Normal left atrium.  LA volume index = 16  cc/m2.  Left Ventricle: Moderate segmental left ventricular  systolic dysfunction. The basal to mid inferior and  inferolateral walls are severely hypokinetic. 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  Normal left ventricular  internal dimensions and wall thicknesses.  Right Heart: Normal right atrium. Normal right ventricular  size with decreased right ventricular systolic function.  Normal tricuspid valve. Mild tricuspid regurgitation.  Pulmonic valve not well visualized.  Pericardium/Pleura: Normal pericardium with no pericardial  effusion.  Hemodynamic: Estimated right atrial pressure is 8 mm Hg.  Estimated right ventricular systolic pressure equals 22 mm  Hg, assuming right atrial pressure equals 8 mm Hg,  consistent with normal pulmonary pressures.  ------------------------------------------------------------------------  Conclusions:  1. Moderate segmental left ventricular systolic  dysfunction. The basal to mid inferior and inferolateral  walls are severely hypokinetic. The basal inferior wall is  aneurysmal.  2. Normal right ventricular size with decreased right  ventricular systolic function.  3. Estimated pulmonary artery systolic pressure equals 22  mm Hg, assuming right atrial pressure equals 8  mm Hg,  consistent with normal pulmonary pressures.  *** Compared with echocardiogram of 12/18/2015, LV systolic  dysfunction is new.  ------------------------------------------------------------------------  Confirmed on  5/2/2018 - 11:06:00 by Franco Marcelo M.D.      LABS:   CBC Full  -  ( 03 May 2018 08:01 )  WBC Count : 10.44 K/uL  Hemoglobin : 13.5 g/dL  Hematocrit : 41.6 %  Platelet Count - Automated : 250 K/uL  Mean Cell Volume : 85.2 fl  Mean Cell Hemoglobin : 27.7 pg  Mean Cell Hemoglobin Concentration : 32.5 gm/dL  Auto Neutrophil # : 3.39 K/uL  Auto Lymphocyte # : 1.57 K/uL  Auto Monocyte # : 1.04 K/uL  Auto Eosinophil # : 4.44 K/uL  Auto Basophil # : 0.00 K/uL  Auto Neutrophil % : 32.5 %  Auto Lymphocyte % : 15.0 %  Auto Monocyte % : 10.0 %  Auto Eosinophil % : 42.5 %  Auto Basophil % : 0.0 %      05-03    135  |  100  |  13  ----------------------------<  158<H>  4.6   |  24  |  1.16    Ca    9.3      03 May 2018 06:13  Phos  3.7     05-03  Mg     2.2     05-03    TPro  7.2  /  Alb  3.6  /  TBili  0.4  /  DBili  x   /  AST  24  /  ALT  14  /  AlkPhos  69  05-02    CARDIAC MARKERS ( 02 May 2018 00:27 )  x     / 0.38 ng/mL / 129 U/L / x     / 13.2 ng/mL  CARDIAC MARKERS ( 01 May 2018 16:31 )  x     / 0.09 ng/mL / 93 U/L / x     / 3.6 ng/mL        RADIOLOGY & ADDITIONAL STUDIES:    IMPRESSION AND PLAN:  72 m w/pmh T2DM , HTN  , FH CAD, who p/w atypical chest pain , cardiac enzymes negative x 3, in the setting of unstable angina. Now s/p cath with 100%  occlusion distal RCA s/p VIGNESH x 1, 90% occlusion proximal RPL s/p VIGNESH x 1,  and 95% occlusion to proximal RPDA s/p DEX x 1. And successful Staged PCI of LAD. Initiating medical management.  Discussed with him the new medications, need for lifestyle changes and eventual referral to outpatient cardiac rehab    Problem: Unstable Angina now s/p PCI and RCA  - VIGNESH RCA x 1, RPL x1, RPDA x 1; Staged PCI with VIGNESH X1 to pLAD   - Continue with ASA, Plavix, statin  -continue  lisinopril 2.5 mg daily, uptitrate to 5 mg if tolerated  -begin metoprolol 12.5mg bid, monitor Hr on tele, hold if HR consistently < 55bpm or he is symptomatic    Problem: Hypertension  -initiated lisinopril, as above, uptitrate to 5 mg if tolerated  - BB once HR tolerates    -outpatient cardiology followup with Ivonne Morse on wed May 16 at noon    cardiology will follow    Alexander Christensen MD, PhD  Cardiology Attending  [facility]  [phone]    (Cardiology Nocturnist cell number available 7 pm - 7 am every night; available daytime week days for follow-up only; daytime weekends covered by general cardiology consult service)

## 2018-05-03 NOTE — DISCHARGE NOTE ADULT - MEDICATION SUMMARY - MEDICATIONS TO STOP TAKING
I will STOP taking the medications listed below when I get home from the hospital:    captopril  -- 12.5 milligram(s) by mouth once a day    pravastatin 40 mg oral tablet  -- 1 tab(s) by mouth once a day (at bedtime)

## 2018-05-03 NOTE — DISCHARGE NOTE ADULT - PLAN OF CARE
Angioplasty or coronary stenting are procedures to open up narrowed or blocked coronary arteries in the heart.  A stent is a tiny metal tube that helps to prop open an artery in the heart muscle.    Your doctor will instruct you when you can drive or resume usual physical activities  You MUST take aspirin & another agent Plavix, Brilinta) to help prevent clots inside the stent.  It is VERY important to take these medications as directed unless your cardiologist says it is OK to stop.  If another physican advises you to stop them, call cardiologist to discuss this advise since there is a risk of a heart attack or even death stopping these medications earlier than they should be.  Do NOT take more than 81 mg aspirin with Brilinta  The most common problems after coronary stenting is bleeding, bruising, & soreness at the tube insertion site - you can use tylenol for discomfort if not contraindicated  Call your doctor if you have chest pain, fever, pain, swelling, or redness where the tube went in Follow up with your medical doctor to establish long term blood pressure treatment goals. Low salt diet  Activity as tolerated.  Take all medication as prescribed.  Follow up with your medical doctor for routine blood pressure monitoring at your next visit.  Notify your doctor if you have any of the following symptoms:   Dizziness, Lightheadedness, Blurry vision, Headache, Chest pain, Shortness of breath disease management HgA1C this admission 8.1  Make sure you get your HgA1c checked every three months.  If you take oral diabetes medications, check your blood glucose two times a day.  If you take insulin, check your blood glucose before meals and at bedtime.  It's important not to skip any meals.  Keep a log of your blood glucose results and always take it with you to your doctor appointments.  Keep a list of your current medications including injectables and over the counter medications and bring this medication list with you to all your doctor appointments.  If you have not seen your ophthalmologist this year call for appointment.  Check your feet daily for redness, sores, or openings. Do not self treat. If no improvement in two days call your primary care physician for an appointment.  Low blood sugar (hypoglycemia) is a blood sugar below 70mg/dl. Check your blood sugar if you feel signs/symptoms of hypoglycemia. If your blood sugar is below 70 take 15 grams of carbohydrates (ex 4 oz of apple juice, 3-4 glucose tablets, or 4-6 oz of regular soda) wait 15 minutes and repeat blood sugar to make sure it comes up above 70.  If your blood sugar is above 70 and you are due for a meal, have a meal.  If you are not due for a meal have a snack.  This snack helps keeps your blood sugar at a safe range. risk modification Coronary artery disease is a condition where the arteries the supply the heart muscle get clogges with fatty deposits & puts you at risk for a heart attack  Call your doctor if you have any new pain, pressure, or discomfort in the center of your chest, pain, tingling or discomfort in arms, back, neck, jaw, or stomach, shortness of breath, nausea, vomiting, burping or heartburn, sweating, cold and clammy skin, racing or abnormal heartbeat for more than 10 minutes or if they keep coming & going.  Call 911 and do not tr to get to hospital by care  You can help yourself with lefestyle changes (quitting smoking if you smoke), eat lots of fruits & vegetables & low fat dairy products, not a lot of meat & fatty foods, walk or some form of physical activity most days of the week, lose weight if you are overweight  Take your cardiac medication as prescribed to lower cholesterol, to lower blood pressure, aspirin to prevent blood clots, and diabetes control  Make sure to keep appointments with doctor for cardiac follow up care patent stent

## 2018-05-03 NOTE — PROGRESS NOTE ADULT - SUBJECTIVE AND OBJECTIVE BOX
Patient is a 72y old  Male who presents with a chief complaint of Chest pain (03 May 2018 13:16)        SUBJECTIVE / OVERNIGHT EVENTS:  No acute issues overnight, afebrile.  no complaints this am.   no evetns on tele.     MEDICATIONS  (STANDING):  aspirin enteric coated 81 milliGRAM(s) Oral daily  atorvastatin 80 milliGRAM(s) Oral daily  clopidogrel Tablet 75 milliGRAM(s) Oral daily  dextrose 5%. 1000 milliLiter(s) (50 mL/Hr) IV Continuous <Continuous>  dextrose 50% Injectable 25 Gram(s) IV Push once  insulin glargine Injectable (LANTUS) 10 Unit(s) SubCutaneous at bedtime  insulin lispro (HumaLOG) corrective regimen sliding scale   SubCutaneous three times a day before meals  insulin lispro (HumaLOG) corrective regimen sliding scale   SubCutaneous at bedtime  insulin lispro Injectable (HumaLOG) 3 Unit(s) SubCutaneous three times a day before meals  lisinopril 2.5 milliGRAM(s) Oral daily  metoprolol tartrate 12.5 milliGRAM(s) Oral two times a day    MEDICATIONS  (PRN):  acetaminophen   Tablet. 650 milliGRAM(s) Oral every 6 hours PRN Mild Pain (1 - 3)  dextrose Gel 1 Dose(s) Oral once PRN Blood Glucose LESS THAN 70 milliGRAM(s)/deciliter  glucagon  Injectable 1 milliGRAM(s) IntraMuscular once PRN Glucose LESS THAN 70 milligrams/deciliter      Vital Signs Last 24 Hrs  T(C): 36.8 (03 May 2018 12:05), Max: 37.4 (02 May 2018 19:00)  T(F): 98.2 (03 May 2018 12:05), Max: 99.3 (02 May 2018 19:00)  HR: 62 (03 May 2018 12:05) (60 - 75)  BP: 114/75 (03 May 2018 12:05) (103/66 - 149/93)  BP(mean): 85 (02 May 2018 23:00) (78 - 112)  RR: 18 (03 May 2018 12:05) (16 - 22)  SpO2: 96% (03 May 2018 12:05) (95% - 97%)  CAPILLARY BLOOD GLUCOSE      POCT Blood Glucose.: 148 mg/dL (03 May 2018 12:08)  POCT Blood Glucose.: 175 mg/dL (03 May 2018 07:37)  POCT Blood Glucose.: 205 mg/dL (02 May 2018 21:57)  POCT Blood Glucose.: 120 mg/dL (02 May 2018 16:03)    I&O's Summary    02 May 2018 07:01  -  03 May 2018 07:00  --------------------------------------------------------  IN: 840 mL / OUT: 1600 mL / NET: -760 mL          PHYSICAL EXAM  GENERAL: NAD, well-developed  HEAD:  Atraumatic, Normocephalic  EYES: EOMI, conjunctiva and sclera clear  NECK: Supple, No JVD  CHEST/LUNG: Clear to auscultation bilaterally; No wheeze  HEART: Regular rate and rhythm; No murmurs, rubs, or gallops  ABDOMEN: Soft, Nontender, Nondistended; Bowel sounds present  EXTREMITIES:  2+ Peripheral Pulses, No clubbing, cyanosis, or edema  PSYCH: AAOx3  SKIN: No rashes or lesions    LABS:                        13.5   10.44 )-----------( 250      ( 03 May 2018 08:01 )             41.6     05-03    135  |  100  |  13  ----------------------------<  158<H>  4.6   |  24  |  1.16    Ca    9.3      03 May 2018 06:13  Phos  3.7     05-03  Mg     2.2     05-03    TPro  7.2  /  Alb  3.6  /  TBili  0.4  /  DBili  x   /  AST  24  /  ALT  14  /  AlkPhos  69  05-02      CARDIAC MARKERS ( 02 May 2018 00:27 )  x     / 0.38 ng/mL / 129 U/L / x     / 13.2 ng/mL  CARDIAC MARKERS ( 01 May 2018 16:31 )  x     / 0.09 ng/mL / 93 U/L / x     / 3.6 ng/mL            RADIOLOGY & ADDITIONAL TESTS:    Imaging Personally Reviewed:  Consultant(s) Notes Reviewed:  card  Care Discussed with Consultants/Other Providers:

## 2018-05-03 NOTE — DISCHARGE NOTE ADULT - MEDICATION SUMMARY - MEDICATIONS TO TAKE
I will START or STAY ON the medications listed below when I get home from the hospital:    metFORMIN  -- 500 milligram(s) by mouth 2 times a day  -- Indication: For Diabetes I will START or STAY ON the medications listed below when I get home from the hospital:    aspirin 81 mg oral delayed release tablet  -- 1 tab(s) by mouth once a day  -- Indication: For Coronary artery disease, occlusive    lisinopril 2.5 mg oral tablet  -- 1 tab(s) by mouth once a day  -- Indication: For Coronary artery disease, occlusive    metFORMIN  -- 500 milligram(s) by mouth 2 times a day  -- Indication: For Diabetes    atorvastatin 80 mg oral tablet  -- 1 tab(s) by mouth once a day  -- Indication: For Coronary artery disease, occlusive    clopidogrel 75 mg oral tablet  -- 1 tab(s) by mouth once a day  -- Indication: For Coronary artery disease, occlusive    Metoprolol Tartrate 25 mg oral tablet  -- 1 tab(s) by mouth once a day   -- Indication: For Coronary artery disease, occlusive

## 2018-05-03 NOTE — DISCHARGE NOTE ADULT - CARE PROVIDER_API CALL
Alexander Christensen; PhD), Medicine  Cardiology  [address] [place] [address]  Phone: [phone]  Fax: [phone] Ivonne Morse), Cardiology  [address] [place] [address]  Phone: [phone]  Fax: [phone]

## 2018-05-03 NOTE — DISCHARGE NOTE ADULT - ADDITIONAL INSTRUCTIONS
You have an appointment with the Cardiologist - Dr Ivonne Morse - on Wednesday, May 16 at noon - call to confirm appointment  Make appointments to follow up with your physicians - bring all discharge paperwork including discharge medication list to follow up appointments.

## 2018-05-03 NOTE — PROGRESS NOTE ADULT - PROBLEM SELECTOR PLAN 1
S/p 5 VIGNESH to RCA, LAD, Diag. (staged)   Will cont with ASA, Plavix, statin  Add beta blocker and ACE-I. as BP tolerates.   Echo with EF 45%

## 2018-05-03 NOTE — CHART NOTE - NSCHARTNOTEFT_GEN_A_CORE
72M T2DM , HTN  p/w atypical chest pain. Cardiac enzymes negative x 3, in the setting of unstable angina. Patient underwent urgent cath with 100% occlusion distal RCA s/p VIGNESH x 1, 90% occlusion proximal RPL s/p VIGNESH x 1,  and 95% occlusion to proximal RPDA s/p DEX x 1. Occlusions were also noted in prox LAD and prox diagonal, and patient underwent staged PCI with 1 stent to pLAD today, 5/2/18. TTE demonstrated EF 45%. Patient started on ASA, plavix, atorvastatin and lisinopril. BB currently being held 2/2 bradycardia, plan to initiate when HR tolerates. Of note, patient found to have eosinophilia, strongyloides and O+P Ab were sent by the CCU team.       MEDICATIONS:  acetaminophen   Tablet. 650 milliGRAM(s) Oral every 6 hours PRN  aspirin enteric coated 81 milliGRAM(s) Oral daily  atorvastatin 80 milliGRAM(s) Oral daily  clopidogrel Tablet 75 milliGRAM(s) Oral daily  dextrose 5%. 1000 milliLiter(s) IV Continuous <Continuous>  dextrose 50% Injectable 25 Gram(s) IV Push once  dextrose Gel 1 Dose(s) Oral once PRN  glucagon  Injectable 1 milliGRAM(s) IntraMuscular once PRN  insulin glargine Injectable (LANTUS) 10 Unit(s) SubCutaneous at bedtime  insulin lispro (HumaLOG) corrective regimen sliding scale   SubCutaneous three times a day before meals  insulin lispro (HumaLOG) corrective regimen sliding scale   SubCutaneous at bedtime  insulin lispro Injectable (HumaLOG) 3 Unit(s) SubCutaneous three times a day before meals  lisinopril 2.5 milliGRAM(s) Oral daily      Plan:    CV:   Problem: Unstable Angina  - VIGNESH RCA x 1, RPL x1, RPDA x 1; Staged PCI with VIGNESH X1 to pLAD today   - Continue with ASA, Plavix, statin  - d/manny captopril in light of softer BPs; initiated lisinopril 2.5 mg daily, uptitrate to 5 mg if tolerated  - BB once HR tolerates    Problem: Hypertension  -initiated lisinopril, as above, uptitrate to 5 mg if tolerated  - BB once HR tolerates    GI:   -No active issues  -Carb controlled/DASH diet    Endocrine:   Problem: T2DM  -HgbA1C of 8, not far from target of 7.5 for an elderly patient  -Holding home metformin  -Low dose ISS  -Given hyperglycemia, started lantus 10 u qhs and humalog 3 u tid-ac    Heme:   Problem: Eosinophilia  -f/u strongyloides ab  -stool O&P    DVT ppx:   -SCDs          Nestor Mcbride MD  PGY-3 | Internal Medicine  Pager: [phone]   [phone] 72M T2DM , HTN  p/w atypical chest pain. Cardiac enzymes negative x 3, in the setting of unstable angina. Patient underwent urgent cath with 100% occlusion distal RCA s/p VIGNESH x 1, 90% occlusion proximal RPL s/p VIGNESH x 1,  and 95% occlusion to proximal RPDA s/p DEX x 1. Occlusions were also noted in prox LAD and prox diagonal, and patient underwent staged PCI with 1 stent to pLAD today, 5/2/18. TTE demonstrated EF 45%. Patient started on ASA, plavix, atorvastatin and lisinopril. BB currently being held 2/2 bradycardia, plan to initiate when HR tolerates. Of note, patient found to have eosinophilia, strongyloides and O+P Ab were sent by the CCU team.       MEDICATIONS:  acetaminophen   Tablet. 650 milliGRAM(s) Oral every 6 hours PRN  aspirin enteric coated 81 milliGRAM(s) Oral daily  atorvastatin 80 milliGRAM(s) Oral daily  clopidogrel Tablet 75 milliGRAM(s) Oral daily  dextrose 5%. 1000 milliLiter(s) IV Continuous <Continuous>  dextrose 50% Injectable 25 Gram(s) IV Push once  dextrose Gel 1 Dose(s) Oral once PRN  glucagon  Injectable 1 milliGRAM(s) IntraMuscular once PRN  insulin glargine Injectable (LANTUS) 10 Unit(s) SubCutaneous at bedtime  insulin lispro (HumaLOG) corrective regimen sliding scale   SubCutaneous three times a day before meals  insulin lispro (HumaLOG) corrective regimen sliding scale   SubCutaneous at bedtime  insulin lispro Injectable (HumaLOG) 3 Unit(s) SubCutaneous three times a day before meals  lisinopril 2.5 milliGRAM(s) Oral daily      Plan:    CV:   Problem: Unstable Angina  - VIGNESH RCA x 1, RPL x1, RPDA x 1; Staged PCI with VIGNESH X1 to pLAD today   - Continue with ASA, Plavix, statin  - d/manny captopril in light of softer BPs; initiated lisinopril 2.5 mg daily, uptitrate to 5 mg if tolerated  - BB once HR tolerates    Problem: Hypertension  -initiated lisinopril, as above, uptitrate to 5 mg if tolerated  - BB once HR tolerates    GI:   -No active issues  -Carb controlled/DASH diet    Endocrine:   Problem: T2DM  -HgbA1C of 8, not far from target of 7.5 for an elderly patient  -Holding home metformin  -Low dose ISS  -Given hyperglycemia, started lantus 10 u qhs and humalog 3 u tid-ac    Heme:   Problem: Eosinophilia  -f/u strongyloides ab  -stool O&P    DVT ppx:   -SCDs          Nestor Mcbride MD  PGY-3 | Internal Medicine  Pager: [phone]   [phone]    signed out to Adeline at 0603 on 5/3/18 by MAR TRANSFER/ACCEPT Note      72M T2DM , HTN  p/w atypical chest pain. Cardiac enzymes negative x 3, in the setting of unstable angina. Patient underwent urgent cath with 100% occlusion distal RCA s/p VIGNESH x 1, 90% occlusion proximal RPL s/p VIGNESH x 1,  and 95% occlusion to proximal RPDA s/p DEX x 1. Occlusions were also noted in prox LAD and prox diagonal, and patient underwent staged PCI with 1 stent to pLAD today, 5/2/18. TTE demonstrated EF 45%. Patient started on ASA, plavix, atorvastatin and lisinopril. BB currently being held 2/2 bradycardia, plan to initiate when HR tolerates. Of note, patient found to have eosinophilia, strongyloides and O+P Ab were sent by the CCU team.     Subjective: Pt seen and evaluated at bedside, laying in bed comfortably, no acute complaints.     CONSTITUTIONAL:  No fever, chills, weakness or fatigue.  HEENT:  No visual loss, blurred vision, double vision or yellow sclerae. No hearing loss, sneezing, congestion, runny nose or sore throat.  SKIN:  No rash or itching.  CARDIOVASCULAR:  No chest pain, chest pressure or chest discomfort. No palpitations   RESPIRATORY:  No shortness of breath, cough or sputum.  GASTROINTESTINAL:  No nausea, vomiting or diarrhea. No abdominal pain, melena, hematochezia.   GENITOURINARY:  Denies hematuria, dysuria.   NEUROLOGICAL:  No headache, dizziness, syncope, paralysis, ataxia, numbness or tingling in the extremities.   MUSCULOSKELETAL:  No muscle, back pain, joint pain or stiffness.  HEMATOLOGIC:  No bleeding or bruising.  ENDOCRINOLOGIC:  No reports of sweating, cold or heat intolerance. No polyuria or polydipsia.    PAST MEDICAL & SURGICAL HISTORY:  Diabetes  Hypertension  No significant past surgical history    Allergies: No Known Allergies    Home Medications:  aspirin: 81 milligram(s) orally once a day (01 May 2018 12:47)  captopril: 12.5 milligram(s) orally once a day (01 May 2018 06:31)  metFORMIN: 500 milligram(s) orally 2 times a day (01 May 2018 12:47)  pravastatin 40 mg oral tablet: 1 tab(s) orally once a day (at bedtime) (01 May 2018 06:31)    FAMILY HISTORY:  Family history of acute myocardial infarction (Father, Sibling)    SOCIAL History: former alcohol use, no smoking, no drug use.     MEDICATIONS:  acetaminophen   Tablet. 650 milliGRAM(s) Oral every 6 hours PRN  aspirin enteric coated 81 milliGRAM(s) Oral daily  atorvastatin 80 milliGRAM(s) Oral daily  clopidogrel Tablet 75 milliGRAM(s) Oral daily  dextrose 5%. 1000 milliLiter(s) IV Continuous <Continuous>  dextrose 50% Injectable 25 Gram(s) IV Push once  dextrose Gel 1 Dose(s) Oral once PRN  glucagon  Injectable 1 milliGRAM(s) IntraMuscular once PRN  insulin glargine Injectable (LANTUS) 10 Unit(s) SubCutaneous at bedtime  insulin lispro (HumaLOG) corrective regimen sliding scale   SubCutaneous three times a day before meals  insulin lispro (HumaLOG) corrective regimen sliding scale   SubCutaneous at bedtime  insulin lispro Injectable (HumaLOG) 3 Unit(s) SubCutaneous three times a day before meals  lisinopril 2.5 milliGRAM(s) Oral daily    Vital Signs Last 24 Hrs  T(C): 37.3 (03 May 2018 04:26), Max: 37.4 (02 May 2018 19:00)  T(F): 99.2 (03 May 2018 04:26), Max: 99.3 (02 May 2018 19:00)  HR: 75 (03 May 2018 04:26) (60 - 75)  BP: 105/66 (03 May 2018 04:26) (103/66 - 149/93)  BP(mean): 85 (02 May 2018 23:00) (78 - 112)  RR: 18 (03 May 2018 04:26) (16 - 22)  SpO2: 96% (03 May 2018 04:26) (95% - 99%)    PHYSICAL EXAM:  Appearance: Normal, well appearing, NAD	  HEENT:   Normal oral mucosa, PERRL, EOMI	  Cardiovascular: Normal S1 S2, No JVD, No murmurs, No edema  Respiratory: Lungs clear to auscultation	  Psychiatry: A & O x 3, Mood & affect appropriate  Gastrointestinal:  Soft, Non-tender, + BS, Obese. 	  Skin: No rashes, No ecchymoses, No cyanosis	  Neurologic: Non-focal  Extremities: Normal range of motion, No clubbing, cyanosis or edema     LABORATORY:               13.5   10.44 )-----------( 250      ( 03 May 2018 08:01 )             41.6     Auto Eosinophil # x     / Auto Eosinophil % x     / Auto Neutrophil # x     / Auto Neutrophil % x     / BANDS % x                            13.7   10.6  )-----------( 254      ( 02 May 2018 00:27 )             42.2     Auto Eosinophil # x     / Auto Eosinophil % x     / Auto Neutrophil # x     / Auto Neutrophil % x     / BANDS % x                            13.9   12.6  )-----------( 264      ( 01 May 2018 16:31 )             41.9     Auto Eosinophil # x     / Auto Eosinophil % x     / Auto Neutrophil # x     / Auto Neutrophil % x     / BANDS % x        05-03    135  |  100  |  13  ----------------------------<  158<H>  4.6   |  24  |  1.16  05-02    136  |  100  |  13  ----------------------------<  111<H>  4.2   |  24  |  1.11  05-02    136  |  101  |  14  ----------------------------<  164<H>  4.2   |  24  |  1.05    Ca    9.3      03 May 2018 06:13  Mg     2.2     05-03  Phos  3.7     05-03  TPro  7.2  /  Alb  3.6  /  TBili  0.4  /  DBili  x   /  AST  24  /  ALT  14  /  AlkPhos  69  05-02  TPro  7.4  /  Alb  3.6  /  TBili  0.4  /  DBili  x   /  AST  25  /  ALT  14  /  AlkPhos  71  05-01  TPro  7.4  /  Alb  3.8  /  TBili  0.5  /  DBili  x   /  AST  16  /  ALT  15  /  AlkPhos  72  05-01      CARDIAC MARKERS ( 02 May 2018 00:27 )  x     / 0.38 ng/mL / 129 U/L / x     / 13.2 ng/mL  CARDIAC MARKERS ( 01 May 2018 16:31 )  x     / 0.09 ng/mL / 93 U/L / x     / 3.6 ng/mL  CARDIAC MARKERS ( 01 May 2018 10:31 )  x     / 0.05 ng/mL / 72 U/L / x     / 1.8 ng/mL    Cxray reviewed: Clear lungs    A/P: 72M T2DM , HTN  p/w atypical chest pain s/p urgent cath with 100% occlusion distal RCA s/p VIGNESH x 1, 90% occlusion proximal RPL s/p VIGNESH x 1,  and 95% occlusion to proximal RPDA s/p DEX x 1, and staged PCI with 1 stent to pLAD. Hospital course complicated by bradycardia and eosinophilia.     #Unstable Angina: s/p - VIGNESH RCA x 1, RPL x1, RPDA x 1; Staged PCI with VIGNESH X1 to pLAD  - Continue with ASA, Plavix, statin  - initiated lisinopril 2.5 mg daily, uptitrate to 5 mg if tolerated  - BB once HR tolerates    #T2DM: HgbA1C of 8.   -Holding home metformin  -Low dose ISS  -was started lantus 10 u qhs and humalog 3 u tid-ac while inpatient, uptitrate as necessary    # Eosinophilia  -CBC with diff daily  -f/u strongyloides ab  -stool O&P    signed out to Adeline at 0603 on 5/3/18 by HARRIS Mcbride, PGY3 [id number]

## 2018-05-03 NOTE — DISCHARGE NOTE ADULT - HOSPITAL COURSE
72 year old gentleman with medical history of  T2DM , HTN  , FH CAD, who presented to the hospital on May 1, 2018 with complaints of  atypical chest pain in the setting of unstable angina.   Serial Cardiac enzymes were negative x 3,.  Underwent cardiac catheterization on May 1, 2018 with 100%  occlusion distal RCA s/p VIGNESH x 1, 90% occlusion proximal RPL s/p VIGNESH x 1,  and 95% occlusion to proximal RPDA s/p DEX x1   On May 2, 2018 he underwent a  Staged PCI of LAD.  Medical Management was initiated following 4 stent placements 72 year old gentleman with medical history of  T2DM , HTN  , FH CAD, who presented to the hospital on May 1, 2018 with complaints of  atypical chest pain in the setting of unstable angina.  Serial Cardiac enzymes were negative x 3,. Underwent cardiac catheterization on May 1, 2018 with 100%  occlusion distal RCA s/p VIGNESH x 1, 90% occlusion proximal RPL s/p VIGNESH x 1,  and 95% occlusion to proximal RPDA s/p DEX x1.  On May 2, 2018 he underwent a  Staged PCI of LAD. Medical Management was initiated following 5 stents placements

## 2018-05-03 NOTE — DISCHARGE NOTE ADULT - PATIENT PORTAL LINK FT
You can access the [facility] Patient Portal, offered by [facility], by registering with the following website: [email or web address]

## 2018-05-04 VITALS — WEIGHT: 209.88 LBS

## 2018-05-04 LAB
ANION GAP SERPL CALC-SCNC: 12 MMOL/L — SIGNIFICANT CHANGE UP (ref 5–17)
BUN SERPL-MCNC: 16 MG/DL — SIGNIFICANT CHANGE UP (ref 7–23)
CALCIUM SERPL-MCNC: 9.4 MG/DL — SIGNIFICANT CHANGE UP (ref 8.4–10.5)
CHLORIDE SERPL-SCNC: 101 MMOL/L — SIGNIFICANT CHANGE UP (ref 96–108)
CO2 SERPL-SCNC: 22 MMOL/L — SIGNIFICANT CHANGE UP (ref 22–31)
CREAT SERPL-MCNC: 1.23 MG/DL — SIGNIFICANT CHANGE UP (ref 0.5–1.3)
GLUCOSE BLDC GLUCOMTR-MCNC: 131 MG/DL — HIGH (ref 70–99)
GLUCOSE BLDC GLUCOMTR-MCNC: 172 MG/DL — HIGH (ref 70–99)
GLUCOSE SERPL-MCNC: 146 MG/DL — HIGH (ref 70–99)
HCT VFR BLD CALC: 42.6 % — SIGNIFICANT CHANGE UP (ref 39–50)
HGB BLD-MCNC: 14.1 G/DL — SIGNIFICANT CHANGE UP (ref 13–17)
MAGNESIUM SERPL-MCNC: 2.2 MG/DL — SIGNIFICANT CHANGE UP (ref 1.6–2.6)
MCHC RBC-ENTMCNC: 27.8 PG — SIGNIFICANT CHANGE UP (ref 27–34)
MCHC RBC-ENTMCNC: 33.1 GM/DL — SIGNIFICANT CHANGE UP (ref 32–36)
MCV RBC AUTO: 83.9 FL — SIGNIFICANT CHANGE UP (ref 80–100)
PLATELET # BLD AUTO: 247 K/UL — SIGNIFICANT CHANGE UP (ref 150–400)
POTASSIUM SERPL-MCNC: 4.7 MMOL/L — SIGNIFICANT CHANGE UP (ref 3.5–5.3)
POTASSIUM SERPL-SCNC: 4.7 MMOL/L — SIGNIFICANT CHANGE UP (ref 3.5–5.3)
RBC # BLD: 5.08 M/UL — SIGNIFICANT CHANGE UP (ref 4.2–5.8)
RBC # FLD: 15.4 % — HIGH (ref 10.3–14.5)
SODIUM SERPL-SCNC: 135 MMOL/L — SIGNIFICANT CHANGE UP (ref 135–145)
STRONGYLOIDES AB SER-ACNC: POSITIVE
WBC # BLD: 12.26 K/UL — HIGH (ref 3.8–10.5)
WBC # FLD AUTO: 12.26 K/UL — HIGH (ref 3.8–10.5)

## 2018-05-04 PROCEDURE — 82435 ASSAY OF BLOOD CHLORIDE: CPT

## 2018-05-04 PROCEDURE — 83605 ASSAY OF LACTIC ACID: CPT

## 2018-05-04 PROCEDURE — 99232 SBSQ HOSP IP/OBS MODERATE 35: CPT

## 2018-05-04 PROCEDURE — 84484 ASSAY OF TROPONIN QUANT: CPT

## 2018-05-04 PROCEDURE — 82947 ASSAY GLUCOSE BLOOD QUANT: CPT

## 2018-05-04 PROCEDURE — C9601: CPT | Mod: LD

## 2018-05-04 PROCEDURE — 84443 ASSAY THYROID STIM HORMONE: CPT

## 2018-05-04 PROCEDURE — 85014 HEMATOCRIT: CPT

## 2018-05-04 PROCEDURE — 84132 ASSAY OF SERUM POTASSIUM: CPT

## 2018-05-04 PROCEDURE — 83735 ASSAY OF MAGNESIUM: CPT

## 2018-05-04 PROCEDURE — 71046 X-RAY EXAM CHEST 2 VIEWS: CPT

## 2018-05-04 PROCEDURE — 93005 ELECTROCARDIOGRAM TRACING: CPT

## 2018-05-04 PROCEDURE — 80048 BASIC METABOLIC PNL TOTAL CA: CPT

## 2018-05-04 PROCEDURE — 93306 TTE W/DOPPLER COMPLETE: CPT

## 2018-05-04 PROCEDURE — 82962 GLUCOSE BLOOD TEST: CPT

## 2018-05-04 PROCEDURE — C1894: CPT

## 2018-05-04 PROCEDURE — 84100 ASSAY OF PHOSPHORUS: CPT

## 2018-05-04 PROCEDURE — 99285 EMERGENCY DEPT VISIT HI MDM: CPT | Mod: 25

## 2018-05-04 PROCEDURE — C1725: CPT

## 2018-05-04 PROCEDURE — C1769: CPT

## 2018-05-04 PROCEDURE — 82803 BLOOD GASES ANY COMBINATION: CPT

## 2018-05-04 PROCEDURE — 82330 ASSAY OF CALCIUM: CPT

## 2018-05-04 PROCEDURE — C1887: CPT

## 2018-05-04 PROCEDURE — 80053 COMPREHEN METABOLIC PANEL: CPT

## 2018-05-04 PROCEDURE — C9600: CPT | Mod: LD

## 2018-05-04 PROCEDURE — 82565 ASSAY OF CREATININE: CPT

## 2018-05-04 PROCEDURE — 83036 HEMOGLOBIN GLYCOSYLATED A1C: CPT

## 2018-05-04 PROCEDURE — 93458 L HRT ARTERY/VENTRICLE ANGIO: CPT | Mod: 59

## 2018-05-04 PROCEDURE — 86682 HELMINTH ANTIBODY: CPT

## 2018-05-04 PROCEDURE — 82553 CREATINE MB FRACTION: CPT

## 2018-05-04 PROCEDURE — 99152 MOD SED SAME PHYS/QHP 5/>YRS: CPT

## 2018-05-04 PROCEDURE — 99239 HOSP IP/OBS DSCHRG MGMT >30: CPT

## 2018-05-04 PROCEDURE — C9606: CPT | Mod: RC

## 2018-05-04 PROCEDURE — 85027 COMPLETE CBC AUTOMATED: CPT

## 2018-05-04 PROCEDURE — 80061 LIPID PANEL: CPT

## 2018-05-04 PROCEDURE — 85610 PROTHROMBIN TIME: CPT

## 2018-05-04 PROCEDURE — 82550 ASSAY OF CK (CPK): CPT

## 2018-05-04 PROCEDURE — 82607 VITAMIN B-12: CPT

## 2018-05-04 PROCEDURE — C1874: CPT

## 2018-05-04 PROCEDURE — 84295 ASSAY OF SERUM SODIUM: CPT

## 2018-05-04 PROCEDURE — 87177 OVA AND PARASITES SMEARS: CPT

## 2018-05-04 PROCEDURE — 85730 THROMBOPLASTIN TIME PARTIAL: CPT

## 2018-05-04 PROCEDURE — 99153 MOD SED SAME PHYS/QHP EA: CPT

## 2018-05-04 RX ORDER — METOPROLOL TARTRATE 50 MG
1 TABLET ORAL
Qty: 30 | Refills: 3 | OUTPATIENT
Start: 2018-05-04 | End: 2018-08-31

## 2018-05-04 RX ORDER — METOPROLOL TARTRATE 50 MG
1 TABLET ORAL
Qty: 30 | Refills: 0 | OUTPATIENT
Start: 2018-05-04 | End: 2018-06-02

## 2018-05-04 RX ORDER — LISINOPRIL 2.5 MG/1
1 TABLET ORAL
Qty: 30 | Refills: 3 | OUTPATIENT
Start: 2018-05-04 | End: 2018-08-31

## 2018-05-04 RX ORDER — ATORVASTATIN CALCIUM 80 MG/1
1 TABLET, FILM COATED ORAL
Qty: 30 | Refills: 3 | OUTPATIENT
Start: 2018-05-04 | End: 2018-08-31

## 2018-05-04 RX ORDER — CLOPIDOGREL BISULFATE 75 MG/1
1 TABLET, FILM COATED ORAL
Qty: 30 | Refills: 3 | OUTPATIENT
Start: 2018-05-04 | End: 2018-08-31

## 2018-05-04 RX ORDER — ASPIRIN/CALCIUM CARB/MAGNESIUM 324 MG
1 TABLET ORAL
Qty: 30 | Refills: 3 | OUTPATIENT
Start: 2018-05-04 | End: 2018-08-31

## 2018-05-04 RX ORDER — ATORVASTATIN CALCIUM 80 MG/1
1 TABLET, FILM COATED ORAL
Qty: 30 | Refills: 0 | OUTPATIENT
Start: 2018-05-04 | End: 2018-06-02

## 2018-05-04 RX ORDER — LISINOPRIL 2.5 MG/1
1 TABLET ORAL
Qty: 30 | Refills: 0 | OUTPATIENT
Start: 2018-05-04 | End: 2018-06-02

## 2018-05-04 RX ADMIN — Medication 3 UNIT(S): at 12:31

## 2018-05-04 RX ADMIN — Medication 1: at 12:31

## 2018-05-04 RX ADMIN — Medication 12.5 MILLIGRAM(S): at 05:04

## 2018-05-04 RX ADMIN — Medication 3 UNIT(S): at 08:31

## 2018-05-04 RX ADMIN — LISINOPRIL 2.5 MILLIGRAM(S): 2.5 TABLET ORAL at 05:04

## 2018-05-04 RX ADMIN — CLOPIDOGREL BISULFATE 75 MILLIGRAM(S): 75 TABLET, FILM COATED ORAL at 12:30

## 2018-05-04 RX ADMIN — Medication 81 MILLIGRAM(S): at 12:30

## 2018-05-04 NOTE — PROGRESS NOTE ADULT - SUBJECTIVE AND OBJECTIVE BOX
SUBJ: no chest pain, no SOB , began BB tolerating well    MEDICATIONS  (STANDING):  aspirin enteric coated 81 milliGRAM(s) Oral daily  atorvastatin 80 milliGRAM(s) Oral daily  clopidogrel Tablet 75 milliGRAM(s) Oral daily  dextrose 5%. 1000 milliLiter(s) (50 mL/Hr) IV Continuous <Continuous>  dextrose 50% Injectable 25 Gram(s) IV Push once  insulin glargine Injectable (LANTUS) 10 Unit(s) SubCutaneous at bedtime  insulin lispro (HumaLOG) corrective regimen sliding scale   SubCutaneous three times a day before meals  insulin lispro (HumaLOG) corrective regimen sliding scale   SubCutaneous at bedtime  insulin lispro Injectable (HumaLOG) 3 Unit(s) SubCutaneous three times a day before meals  lisinopril 2.5 milliGRAM(s) Oral daily  metoprolol tartrate 12.5 milliGRAM(s) Oral two times a day    MEDICATIONS  (PRN):  acetaminophen   Tablet. 650 milliGRAM(s) Oral every 6 hours PRN Mild Pain (1 - 3)  dextrose Gel 1 Dose(s) Oral once PRN Blood Glucose LESS THAN 70 milliGRAM(s)/deciliter  glucagon  Injectable 1 milliGRAM(s) IntraMuscular once PRN Glucose LESS THAN 70 milligrams/deciliter    Vital Signs Last 24 Hrs  T(C): 36.7 (04 May 2018 11:48), Max: 36.7 (04 May 2018 04:03)  T(F): 98.1 (04 May 2018 11:48), Max: 98.1 (04 May 2018 11:48)  HR: 64 (04 May 2018 11:48) (52 - 68)  BP: 113/73 (04 May 2018 11:48) (113/73 - 122/78)  BP(mean): --  RR: 18 (04 May 2018 11:48) (18 - 18)  SpO2: 96% (04 May 2018 11:48) (95% - 96%)    REVIEW OF SYSTEMS:  CONSTITUTIONAL: No fever, or fatigue  EYES: No visual disturbances, or discharge  RESPIRATORY: No cough, wheezing, chills or hemoptysis; No shortness of breath  CARDIOVASCULAR: No chest pain, palpitations, dizziness, or leg swelling  GASTROINTESTINAL: No abdominal or epigastric pain. No nausea, vomiting, or hematemesis;   GENITOURINARY: No dysuria, frequency, hematuria, or incontinence  NEUROLOGICAL: No headaches, memory loss, loss of strength, numbness, or tremors  SKIN: No itching, burning, rashes, or lesions   ENDOCRINE: No heat or cold intolerance; No hair loss  MUSCULOSKELETAL: No joint pain or swelling; No muscle, back, or extremity pain      PHYSICAL EXAM:  · CONSTITUTIONAL: M lying in bed NAD      · EYES:  no drainage or redness  · NECK: No bruits; no JVD  ·RESPIRATORY:   airway patent; breath sounds equal; good air movement; respirations non-labored; clear to auscultation bilaterally; no chest wall tenderness; no intercostal retractions; no rales,rhonchi or wheeze  · CARDIOVASCULAR: regular rate and rhythm  no rub  no murmur  normal PMI  . GASTROINTESTINAL:  no distention; no masses palpable; bowel sounds normal  · EXTREMITIES: No cyanosis, clubbing or edema  · VASCULAR:  Equal and normal pulses (radial, femoral), Rt radial + bandage +2 radial pulse  ·NEUROLOGICAL:  Alert and oriented x 3;   · SKIN: No lesions; no rash  · MUSCULOSKELETAL:  No calf tenderness  no joint swelling	    TELEMETRY:SR 55-70, no events      TTE:EF (Visual Estimate): 45 %  Doppler Peak Velocity (m/sec): AoV=1.0 PV=1.0  ------------------------------------------------------------------------  Observations:  Mitral Valve: Mitral annular calcification. Tethered mitral  valve leaflets. The posterior mitral valve leaflet is  severely restricted.  Mild mitral regurgitation.  Aortic Valve/Aorta: Calcified trileaflet aortic valve with  normal opening. Peak transaortic valve gradient equals 4 mm  Hg. No aortic valve regurgitation seen. Peak left  ventricular outflow tract gradient equals 2 mm Hg, mean  gradient is equal to 1 mm Hg, LVOT velocity time integral  equals 15 cm.  Aortic Root: 3 cm.  LVOT diameter: 2 cm.  Left Atrium: Normal left atrium.  LA volume index = 16  cc/m2.  Left Ventricle: Moderate segmental left ventricular  systolic dysfunction. The basal to mid inferior and  inferolateral walls are severely hypokinetic. 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  Normal left ventricular  internal dimensions and wall thicknesses.  Right Heart: Normal right atrium. Normal right ventricular  size with decreased right ventricular systolic function.  Normal tricuspid valve. Mild tricuspid regurgitation.  Pulmonic valve not well visualized.  Pericardium/Pleura: Normal pericardium with no pericardial  effusion.  Hemodynamic: Estimated right atrial pressure is 8 mm Hg.  Estimated right ventricular systolic pressure equals 22 mm  Hg, assuming right atrial pressure equals 8 mm Hg,  consistent with normal pulmonary pressures.  ------------------------------------------------------------------------  Conclusions:  1. Moderate segmental left ventricular systolic  dysfunction. The basal to mid inferior and inferolateral  walls are severely hypokinetic. The basal inferior wall is  aneurysmal.  2. Normal right ventricular size with decreased right  ventricular systolic function.  3. Estimated pulmonary artery systolic pressure equals 22  mm Hg, assuming right atrial pressure equals 8  mm Hg,  consistent with normal pulmonary pressures.  *** Compared with echocardiogram of 12/18/2015, LV systolic  dysfunction is new.  ------------------------------------------------------------------------  Confirmed on  5/2/2018 - 11:06:00 by Franco Marcelo M.D.      LABS:                        14.1   12.26 )-----------( 247      ( 04 May 2018 07:26 )             42.6   05-04    135  |  101  |  16  ----------------------------<  146<H>  4.7   |  22  |  1.23    Ca    9.4      04 May 2018 05:55  Phos  3.7     05-03  Mg     2.2     05-04      Ca    9.3      03 May 2018 06:13  Phos  3.7     05-03  Mg     2.2     05-03    TPro  7.2  /  Alb  3.6  /  TBili  0.4  /  DBili  x   /  AST  24  /  ALT  14  /  AlkPhos  69  05-02    CARDIAC MARKERS ( 02 May 2018 00:27 )  x     / 0.38 ng/mL / 129 U/L / x     / 13.2 ng/mL  CARDIAC MARKERS ( 01 May 2018 16:31 )  x     / 0.09 ng/mL / 93 U/L / x     / 3.6 ng/mL        RADIOLOGY & ADDITIONAL STUDIES:    IMPRESSION AND PLAN:  72 m w/pmh T2DM , HTN  , FH CAD, who p/w atypical chest pain , cardiac enzymes negative x 3, in the setting of unstable angina. Now s/p cath with 100%  occlusion distal RCA s/p VIGNESH x 1, 90% occlusion proximal RPL s/p VIGNESH x 1,  and 95% occlusion to proximal RPDA s/p DEX x 1. And successful Staged PCI of LAD. Initiating medical management.  Discussed with him the new medications, need for lifestyle changes and eventual referral to outpatient cardiac rehab. He is stable for discharge home with close cardiology follow up    Problem: Unstable Angina now s/p PCI and RCA  - VIGNESH RCA x 1, RPL x1, RPDA x 1; Staged PCI with VIGNESH X1 to pLAD   - Continue with ASA, Plavix, statin  -continue  lisinopril 2.5 mg daily, uptitrate to 5 mg if tolerated  -begin metoprolol 12.5mg bid, may switch to toprol XL 25mg for discharge    Problem: Hypertension  -initiated lisinopril, as above, uptitrate to 5 mg if tolerated  - BB once HR tolerates    -outpatient cardiology followup with Ivonne Morse on wed May 16 at noon        Alexander Christensen MD, PhD  Cardiology Attending  [facility]  [phone]    (Cardiology [employer] cell number available 7 pm - 7 am every night; available daytime week days for follow-up only; daytime weekends covered by general cardiology consult service)

## 2018-05-04 NOTE — PROGRESS NOTE ADULT - PROBLEM SELECTOR PLAN 1
S/p 5 VIGNESH to RCA, LAD, Diag. (staged)   Will cont with ASA, Plavix, statin  beta blocker and ACE-I. as BP tolerates.   Echo with EF 45%

## 2018-05-04 NOTE — DIETITIAN INITIAL EVALUATION ADULT. - NS AS NUTRI INTERV ED CONTENT
Nutrition relationship to health/disease/Recommended modifications/Discussed DASH diet education. Reviewed foods high in Na and cholesterol to avoid. Reviewed ways to decrease Na in your diet, discussed meal and snack options, tips for eating out; provided written Heart Healthy Eating Nutrition Therapy handout to Pt. Discussed consistent CHO diet education.  Reviewed foods containing CHO, portion sizes of CHO, CHO counting, pairing CHO with proteins, reading food labels, importance of monitoring blood glucose levels, importance of not skipping meals. Reviewed signs and symptoms of hypoglycemia and how to correct. Reviewed meal and snack options. Pt verbalized understanding and accepted written T2DM Nutrition Therapy diet education . RD remains available for diet education review/Other (specify)

## 2018-05-04 NOTE — DIETITIAN INITIAL EVALUATION ADULT. - NS FNS REASON FOR WEIGHT CHANG
Pt reports a slight decrease in appetite 1 week PTA. Pt reports UBW t be 210lbs, and states he is unaware of how much Wt he lost, Pt is currently 209lbs indicating a 1lb Wt loss./other (specify)

## 2018-05-04 NOTE — DIETITIAN INITIAL EVALUATION ADULT. - DIET TYPE
consistent carbohydrate (no snacks)/No Beef, No Pork/DASH/TLC (sodium and cholesterol restricted diet)

## 2018-05-04 NOTE — DIETITIAN INITIAL EVALUATION ADULT. - ORAL INTAKE PTA
good/Pt reports a good PO intake PTA. Breakfast: bread and cereal, Lunch: Rice, smita potato, chicken and fish. Dinner: Roti. Pt denies water. Sometimes snacks on ice cream or chocolate

## 2018-05-04 NOTE — DIETITIAN INITIAL EVALUATION ADULT. - ENERGY NEEDS
Ht: 5'7", Wt: 209.2lbs, BMI: 32.7kg/m2, IBW: 148lbs(+/-10%), 141%IBW  Pertinent information: Pt admitted with Chest pain. Pt with known HTN and T2DM. S/p cardiac cath with VIGNESH x3.   No Edema, Skin intact

## 2018-05-04 NOTE — PROGRESS NOTE ADULT - SUBJECTIVE AND OBJECTIVE BOX
Patient is a 72y old  Male who presents with a chief complaint of Chest pain (03 May 2018 13:16)        SUBJECTIVE / OVERNIGHT EVENTS:  Denies any chest pain, SOB, feels well. no complaints.   no issues overnight      MEDICATIONS  (STANDING):  aspirin enteric coated 81 milliGRAM(s) Oral daily  atorvastatin 80 milliGRAM(s) Oral daily  clopidogrel Tablet 75 milliGRAM(s) Oral daily  dextrose 5%. 1000 milliLiter(s) (50 mL/Hr) IV Continuous <Continuous>  dextrose 50% Injectable 25 Gram(s) IV Push once  insulin glargine Injectable (LANTUS) 10 Unit(s) SubCutaneous at bedtime  insulin lispro (HumaLOG) corrective regimen sliding scale   SubCutaneous three times a day before meals  insulin lispro (HumaLOG) corrective regimen sliding scale   SubCutaneous at bedtime  insulin lispro Injectable (HumaLOG) 3 Unit(s) SubCutaneous three times a day before meals  lisinopril 2.5 milliGRAM(s) Oral daily  metoprolol tartrate 12.5 milliGRAM(s) Oral two times a day    MEDICATIONS  (PRN):  acetaminophen   Tablet. 650 milliGRAM(s) Oral every 6 hours PRN Mild Pain (1 - 3)  dextrose Gel 1 Dose(s) Oral once PRN Blood Glucose LESS THAN 70 milliGRAM(s)/deciliter  glucagon  Injectable 1 milliGRAM(s) IntraMuscular once PRN Glucose LESS THAN 70 milligrams/deciliter      Vital Signs Last 24 Hrs  T(C): 36.7 (04 May 2018 11:48), Max: 36.7 (04 May 2018 04:03)  T(F): 98.1 (04 May 2018 11:48), Max: 98.1 (04 May 2018 11:48)  HR: 64 (04 May 2018 11:48) (52 - 68)  BP: 113/73 (04 May 2018 11:48) (113/73 - 122/78)  BP(mean): --  RR: 18 (04 May 2018 11:48) (18 - 18)  SpO2: 96% (04 May 2018 11:48) (95% - 96%)  CAPILLARY BLOOD GLUCOSE      POCT Blood Glucose.: 172 mg/dL (04 May 2018 11:50)  POCT Blood Glucose.: 131 mg/dL (04 May 2018 07:53)  POCT Blood Glucose.: 119 mg/dL (03 May 2018 21:19)  POCT Blood Glucose.: 155 mg/dL (03 May 2018 16:34)    I&O's Summary    03 May 2018 07:01  -  04 May 2018 07:00  --------------------------------------------------------  IN: 120 mL / OUT: 0 mL / NET: 120 mL          PHYSICAL EXAM  GENERAL: NAD, well-developed  HEAD:  Atraumatic, Normocephalic  EYES: EOMI, conjunctiva and sclera clear  NECK: Supple, No JVD  CHEST/LUNG: Clear to auscultation bilaterally; No wheeze  HEART: Regular rate and rhythm; No murmurs, rubs, or gallops  ABDOMEN: Soft, Nontender, Nondistended; Bowel sounds present  EXTREMITIES:  2+ Peripheral Pulses, trace LE edema  PSYCH: AAOx3  SKIN: No rashes or lesions    LABS:                        14.1   12.26 )-----------( 247      ( 04 May 2018 07:26 )             42.6     05-04    135  |  101  |  16  ----------------------------<  146<H>  4.7   |  22  |  1.23    Ca    9.4      04 May 2018 05:55  Phos  3.7     05-03  Mg     2.2     05-04                  RADIOLOGY & ADDITIONAL TESTS:    Imaging Personally Reviewed:  Consultant(s) Notes Reviewed:   card  Care Discussed with Consultants/Other Providers:

## 2018-05-04 NOTE — DIETITIAN INITIAL EVALUATION ADULT. - OTHER INFO
Nutrition consult received for DM education. Pt seen reports a fair appetite in house, states the food served is not what he typically eats. Denies food preferences or supplements, intake is documented at 100% of meals. Pt's Hgba1c is 8.1%, checks BG level 1x daily with results between 140-160mg/dl. Pt takes metformin. WAs amenable to T2DM and DASH Diet education. Written materials provided. Denies GI distress, denies chewing/swallowing difficulty. Denies micronutrient supplements. NKFA

## 2018-05-16 ENCOUNTER — APPOINTMENT (OUTPATIENT)
Dept: CARDIOLOGY | Facility: CLINIC | Age: 73
End: 2018-05-16
Payer: SELF-PAY

## 2018-05-16 ENCOUNTER — NON-APPOINTMENT (OUTPATIENT)
Age: 73
End: 2018-05-16

## 2018-05-16 VITALS
BODY MASS INDEX: 32.13 KG/M2 | OXYGEN SATURATION: 100 % | WEIGHT: 212 LBS | SYSTOLIC BLOOD PRESSURE: 134 MMHG | DIASTOLIC BLOOD PRESSURE: 82 MMHG | HEART RATE: 62 BPM | HEIGHT: 68 IN

## 2018-05-16 DIAGNOSIS — I25.10 ATHEROSCLEROTIC HEART DISEASE OF NATIVE CORONARY ARTERY W/OUT ANGINA PECTORIS: ICD-10-CM

## 2018-05-16 PROCEDURE — 93000 ELECTROCARDIOGRAM COMPLETE: CPT

## 2018-05-16 PROCEDURE — 99214 OFFICE O/P EST MOD 30 MIN: CPT

## 2018-05-16 RX ORDER — LISINOPRIL 5 MG/1
5 TABLET ORAL
Qty: 90 | Refills: 0 | Status: ACTIVE | COMMUNITY
Start: 2018-05-16 | End: 1900-01-01

## 2018-05-16 RX ORDER — CLOPIDOGREL BISULFATE 75 MG/1
75 TABLET, FILM COATED ORAL
Qty: 90 | Refills: 1 | Status: ACTIVE | COMMUNITY
Start: 2018-05-16 | End: 1900-01-01

## 2018-05-16 RX ORDER — METOPROLOL SUCCINATE 25 MG/1
25 TABLET, EXTENDED RELEASE ORAL
Qty: 30 | Refills: 3 | Status: ACTIVE | COMMUNITY
Start: 2018-05-16 | End: 1900-01-01

## 2018-05-16 RX ORDER — ATORVASTATIN CALCIUM 40 MG/1
40 TABLET, FILM COATED ORAL
Qty: 30 | Refills: 0 | Status: ACTIVE | COMMUNITY
Start: 2018-05-16 | End: 1900-01-01

## 2018-05-16 RX ORDER — ATORVASTATIN CALCIUM 80 MG/1
80 TABLET, FILM COATED ORAL
Qty: 90 | Refills: 1 | Status: DISCONTINUED | COMMUNITY
Start: 2018-05-16 | End: 2018-05-16

## 2018-05-30 ENCOUNTER — TRANSCRIPTION ENCOUNTER (OUTPATIENT)
Age: 73
End: 2018-05-30

## 2019-04-01 NOTE — DIETITIAN INITIAL EVALUATION ADULT. - NS FNS CHANGE IN WEIGHT
Spoke with patient and informed patient is overdue for office visit. Patient to return call to schedule OV. Refill declined at this time.      LOV 10/16/18  LRF 2/25/19 Loss
